# Patient Record
Sex: FEMALE | Race: WHITE | Employment: FULL TIME | URBAN - METROPOLITAN AREA
[De-identification: names, ages, dates, MRNs, and addresses within clinical notes are randomized per-mention and may not be internally consistent; named-entity substitution may affect disease eponyms.]

---

## 2019-04-25 ENCOUNTER — HOSPITAL ENCOUNTER (OUTPATIENT)
Age: 29
Setting detail: SPECIMEN
Discharge: HOME | End: 2019-04-25
Payer: COMMERCIAL

## 2019-04-25 DIAGNOSIS — R42 DIZZINESS: ICD-10-CM

## 2019-04-25 LAB
ABSOLUTE EOS #: 0.03 K/UL (ref 0–0.44)
ABSOLUTE IMMATURE GRANULOCYTE: <0.03 K/UL (ref 0–0.3)
ABSOLUTE LYMPH #: 2.39 K/UL (ref 1.1–3.7)
ABSOLUTE MONO #: 0.47 K/UL (ref 0.1–1.2)
ANION GAP SERPL CALCULATED.3IONS-SCNC: 15 MMOL/L (ref 9–17)
BASOPHILS # BLD: 1 % (ref 0–2)
BASOPHILS ABSOLUTE: 0.07 K/UL (ref 0–0.2)
BUN BLDV-MCNC: 10 MG/DL (ref 6–20)
BUN/CREAT BLD: NORMAL (ref 9–20)
CALCIUM SERPL-MCNC: 9.3 MG/DL (ref 8.6–10.4)
CHLORIDE BLD-SCNC: 103 MMOL/L (ref 98–107)
CO2: 21 MMOL/L (ref 20–31)
CREAT SERPL-MCNC: 0.58 MG/DL (ref 0.5–0.9)
DIFFERENTIAL TYPE: ABNORMAL
EOSINOPHILS RELATIVE PERCENT: 0 % (ref 1–4)
GFR AFRICAN AMERICAN: >60 ML/MIN
GFR NON-AFRICAN AMERICAN: >60 ML/MIN
GFR SERPL CREATININE-BSD FRML MDRD: NORMAL ML/MIN/{1.73_M2}
GFR SERPL CREATININE-BSD FRML MDRD: NORMAL ML/MIN/{1.73_M2}
GLUCOSE BLD-MCNC: 75 MG/DL (ref 70–99)
HCT VFR BLD CALC: 43.7 % (ref 36.3–47.1)
HEMOGLOBIN: 13.7 G/DL (ref 11.9–15.1)
IMMATURE GRANULOCYTES: 0 %
LYMPHOCYTES # BLD: 27 % (ref 24–43)
MCH RBC QN AUTO: 29 PG (ref 25.2–33.5)
MCHC RBC AUTO-ENTMCNC: 31.4 G/DL (ref 28.4–34.8)
MCV RBC AUTO: 92.4 FL (ref 82.6–102.9)
MONOCYTES # BLD: 5 % (ref 3–12)
NRBC AUTOMATED: 0 PER 100 WBC
PDW BLD-RTO: 12.6 % (ref 11.8–14.4)
PLATELET # BLD: 322 K/UL (ref 138–453)
PLATELET ESTIMATE: ABNORMAL
PMV BLD AUTO: 10.2 FL (ref 8.1–13.5)
POTASSIUM SERPL-SCNC: 4.2 MMOL/L (ref 3.7–5.3)
RBC # BLD: 4.73 M/UL (ref 3.95–5.11)
RBC # BLD: ABNORMAL 10*6/UL
SEG NEUTROPHILS: 67 % (ref 36–65)
SEGMENTED NEUTROPHILS ABSOLUTE COUNT: 6.03 K/UL (ref 1.5–8.1)
SODIUM BLD-SCNC: 139 MMOL/L (ref 135–144)
TSH SERPL DL<=0.05 MIU/L-ACNC: 1.19 MIU/L (ref 0.3–5)
WBC # BLD: 9 K/UL (ref 3.5–11.3)
WBC # BLD: ABNORMAL 10*3/UL

## 2021-03-03 ENCOUNTER — HOSPITAL ENCOUNTER (OUTPATIENT)
Age: 31
Setting detail: SPECIMEN
Discharge: HOME | End: 2021-03-03
Payer: COMMERCIAL

## 2021-03-04 ENCOUNTER — HOSPITAL ENCOUNTER (OUTPATIENT)
Age: 31
Setting detail: SPECIMEN
Discharge: HOME | End: 2021-03-04
Payer: COMMERCIAL

## 2021-03-04 LAB
ESTRADIOL LEVEL: 86 PG/ML (ref 27–314)
FOLLICLE STIMULATING HORMONE: 5.5 U/L (ref 1.7–21.5)
INSULIN COMMENT: NORMAL
INSULIN REFERENCE RANGE:: NORMAL
INSULIN: 11.9 MU/L
PROGESTERONE LEVEL: 0.06 NG/ML
PROLACTIN: 20.43 UG/L (ref 4.79–23.3)
TESTOSTERONE TOTAL: 39 NG/DL (ref 20–70)
TSH SERPL DL<=0.05 MIU/L-ACNC: 1.55 MIU/L (ref 0.3–5)

## 2021-03-09 ENCOUNTER — HOSPITAL ENCOUNTER (OUTPATIENT)
Dept: PREADMISSION TESTING | Age: 31
Discharge: HOME | End: 2021-03-13
Payer: COMMERCIAL

## 2021-03-09 VITALS
HEART RATE: 68 BPM | OXYGEN SATURATION: 97 % | WEIGHT: 216 LBS | BODY MASS INDEX: 38.27 KG/M2 | TEMPERATURE: 99.3 F | DIASTOLIC BLOOD PRESSURE: 89 MMHG | SYSTOLIC BLOOD PRESSURE: 130 MMHG | RESPIRATION RATE: 18 BRPM | HEIGHT: 63 IN

## 2021-03-09 LAB
HCT VFR BLD CALC: 43.4 % (ref 36.3–47.1)
HEMOGLOBIN: 14.4 G/DL (ref 11.9–15.1)
MCH RBC QN AUTO: 29 PG (ref 25.2–33.5)
MCHC RBC AUTO-ENTMCNC: 33.2 G/DL (ref 28.4–34.8)
MCV RBC AUTO: 87.3 FL (ref 82.6–102.9)
NRBC AUTOMATED: 0 PER 100 WBC
PDW BLD-RTO: 12.4 % (ref 11.8–14.4)
PLATELET # BLD: 309 K/UL (ref 138–453)
PMV BLD AUTO: 9.6 FL (ref 8.1–13.5)
RBC # BLD: 4.97 M/UL (ref 3.95–5.11)
WBC # BLD: 6.9 K/UL (ref 3.5–11.3)

## 2021-03-09 PROCEDURE — 85027 COMPLETE CBC AUTOMATED: CPT

## 2021-03-09 PROCEDURE — 36415 COLL VENOUS BLD VENIPUNCTURE: CPT

## 2021-03-09 PROCEDURE — 93005 ELECTROCARDIOGRAM TRACING: CPT | Performed by: ANESTHESIOLOGY

## 2021-03-09 RX ORDER — SODIUM CHLORIDE, SODIUM LACTATE, POTASSIUM CHLORIDE, CALCIUM CHLORIDE 600; 310; 30; 20 MG/100ML; MG/100ML; MG/100ML; MG/100ML
1000 INJECTION, SOLUTION INTRAVENOUS CONTINUOUS
Status: CANCELLED | OUTPATIENT
Start: 2021-03-09

## 2021-03-09 NOTE — H&P
History and Physical    Pt Name: Rosio Almeida  MRN: [id number]  YOB: 1990  Date of evaluation: 3/9/2021    SUBJECTIVE:   History of Chief Complaint:    Patient presents for PAT appointment, complains of right ankle pain. She initially had an injury while working out, was being treated for a sprain (1/2020). She continued with pain, xrays showed a fracture, was casted for a time. Patient says that she underwent injections and then MRI when her pain started to change to \"sharp pains. \"  She has been scheduled for right ankle arthroscopy, OCD repair, peroneal tendon repair. Past Medical History    has a past medical history of Back pain, Dizzy spells, Right ankle injury, and Wellness examination. Past Surgical History  none  Medications  none  Allergies  has No Known Allergies. Family History  family history includes Hypothyroidism in her maternal aunt, mother, and sister; No Known Problems in her father. Social History   reports that she has never smoked. She has never used smokeless tobacco.   reports current alcohol use. reports no history of drug use. Marital Status   Occupation works for [employer]:   VITALS:  height is 5' 3\" (1.6 m) and weight is 216 lb (98 kg). Her temporal temperature is 99.3 °F (37.4 °C). Her blood pressure is 130/89 and her pulse is 68. Her respiration is 18 and oxygen saturation is 97%. CONSTITUTIONAL:alert & oriented x 3, no acute distress. Very pleasant. SKIN:  Warm and dry, no rashes on exposed areas of skin. HEAD:  Normocephalic, atraumatic   EYES: PERRL. EOMs intact. EARS:  Hearing grossly WNL. NOSE:  Nares patent. No rhinorrhea   MOUTH/THROAT:  benign  NECK:supple, no lymphadenopathy  LUNGS: Clear to auscultation bilaterally, no wheezes. CARDIOVASCULAR: Heart sounds are normal.  Regular rate and rhythm without murmur. ABDOMEN: soft, non tender, non distended. EXTREMITIES: no edema bilateral lower extremities.     Testing: EK21  Labs pending: drawn 3/9/2021 and also in chart    IMPRESSIONS:   1. Right ankle pain, injury  2.  has a past medical history of Back pain, Dizzy spells, Right ankle injury, and Wellness examination (2021).    PLANS:   right ankle arthroscopy, OCD repair, peroneal tendon repair    WALKER Reddy PA-C  Electronically signed 3/9/2021 at 10:17 AM

## 2021-03-09 NOTE — PROGRESS NOTES
Anesthesia Focused Assessment    Has patient ever tested positive for COVID? No    STOP-BANG Sleep Apnea Questionnaire    SNORE loudly (heard through closed doors)? No  TIRED, fatigued, sleepy during daytime? No  OBSERVED stopping breathing during sleep? No  High blood PRESSURE being treated? No    BMI over 35? Yes  AGE over 48? No  NECK circumference over 16\"? No  GENDER (male)? No             Total 1  High risk 5-8  Intermediate risk 3-4  Low risk 0-2    Obstructive Sleep Apnea: denies  If YES, machine used: no     Type 1 DM:   no  T2DM:  no    Coronary Artery Disease:  no  Hypertension:  no    Active smoker:  no  Drinks Alcohol:  socially    Dentition: benign    Defib / AICD / Pacemaker: no      Renal Failure/dialysis:  no    Patient was evaluated in PAT & anesthesia guidelines were applied. NPO guidelines, medication instructions and scheduled arrival time were reviewed with patient.     Hx of anesthesia complications:  no  Family hx of anesthesia complications:  no                                                                                                                     Anesthesia contacted:   no  Medical or cardiac clearance ordered: no    WALKER KASPER PA-C  3/9/21  9:46 AM

## 2021-03-10 LAB
EKG ATRIAL RATE: 64 BPM
EKG P AXIS: 50 DEGREES
EKG P-R INTERVAL: 160 MS
EKG Q-T INTERVAL: 432 MS
EKG QRS DURATION: 86 MS
EKG QTC CALCULATION (BAZETT): 445 MS
EKG R AXIS: 78 DEGREES
EKG T AXIS: 53 DEGREES
EKG VENTRICULAR RATE: 64 BPM

## 2021-03-10 PROCEDURE — 93010 ELECTROCARDIOGRAM REPORT: CPT | Performed by: INTERNAL MEDICINE

## 2021-03-13 ENCOUNTER — HOSPITAL ENCOUNTER (OUTPATIENT)
Dept: LAB | Age: 31
Setting detail: SPECIMEN
Discharge: HOME | End: 2021-03-13
Payer: COMMERCIAL

## 2021-03-13 ENCOUNTER — HOSPITAL ENCOUNTER (OUTPATIENT)
Age: 31
Setting detail: SPECIMEN
Discharge: HOME | End: 2021-03-13
Payer: COMMERCIAL

## 2021-03-13 DIAGNOSIS — Z01.818 PRE-OP TESTING: Primary | ICD-10-CM

## 2021-03-13 DIAGNOSIS — Z01.818 PRE-OP TESTING: ICD-10-CM

## 2021-03-15 LAB
SARS-COV-2: NORMAL
SARS-COV-2: NOT DETECTED
SOURCE: NORMAL

## 2021-03-16 ENCOUNTER — ANESTHESIA EVENT (OUTPATIENT)
Dept: OPERATING ROOM | Age: 31
End: 2021-03-16
Payer: COMMERCIAL

## 2021-03-17 ENCOUNTER — ANESTHESIA (OUTPATIENT)
Dept: OPERATING ROOM | Age: 31
End: 2021-03-17
Payer: COMMERCIAL

## 2021-03-17 ENCOUNTER — HOSPITAL ENCOUNTER (OUTPATIENT)
Age: 31
Setting detail: OUTPATIENT SURGERY
Discharge: HOME | End: 2021-03-17
Attending: PODIATRIST | Admitting: PODIATRIST
Payer: COMMERCIAL

## 2021-03-17 VITALS — SYSTOLIC BLOOD PRESSURE: 143 MMHG | OXYGEN SATURATION: 90 % | DIASTOLIC BLOOD PRESSURE: 118 MMHG | TEMPERATURE: 97.5 F

## 2021-03-17 VITALS
SYSTOLIC BLOOD PRESSURE: 110 MMHG | TEMPERATURE: 97.3 F | HEART RATE: 62 BPM | OXYGEN SATURATION: 96 % | DIASTOLIC BLOOD PRESSURE: 65 MMHG | RESPIRATION RATE: 19 BRPM | BODY MASS INDEX: 38.27 KG/M2 | WEIGHT: 216 LBS | HEIGHT: 63 IN

## 2021-03-17 LAB — HCG, PREGNANCY URINE (POC): NEGATIVE

## 2021-03-17 PROCEDURE — 3600000014 HC SURGERY LEVEL 4 ADDTL 15MIN: Performed by: PODIATRIST

## 2021-03-17 PROCEDURE — 7100000010 HC PHASE II RECOVERY - FIRST 15 MIN: Performed by: PODIATRIST

## 2021-03-17 PROCEDURE — 2580000003 HC RX 258

## 2021-03-17 PROCEDURE — 2580000003 HC RX 258: Performed by: PODIATRIST

## 2021-03-17 PROCEDURE — 3700000000 HC ANESTHESIA ATTENDED CARE: Performed by: PODIATRIST

## 2021-03-17 PROCEDURE — 2720000010 HC SURG SUPPLY STERILE: Performed by: PODIATRIST

## 2021-03-17 PROCEDURE — 3600000004 HC SURGERY LEVEL 4 BASE: Performed by: PODIATRIST

## 2021-03-17 PROCEDURE — 7100000000 HC PACU RECOVERY - FIRST 15 MIN: Performed by: PODIATRIST

## 2021-03-17 PROCEDURE — 7100000001 HC PACU RECOVERY - ADDTL 15 MIN: Performed by: PODIATRIST

## 2021-03-17 PROCEDURE — 3700000001 HC ADD 15 MINUTES (ANESTHESIA): Performed by: PODIATRIST

## 2021-03-17 PROCEDURE — 6360000002 HC RX W HCPCS: Performed by: NURSE ANESTHETIST, CERTIFIED REGISTERED

## 2021-03-17 PROCEDURE — 64447 NJX AA&/STRD FEMORAL NRV IMG: CPT | Performed by: ANESTHESIOLOGY

## 2021-03-17 PROCEDURE — 6360000002 HC RX W HCPCS: Performed by: ANESTHESIOLOGY

## 2021-03-17 PROCEDURE — 2500000003 HC RX 250 WO HCPCS: Performed by: ANESTHESIOLOGY

## 2021-03-17 PROCEDURE — 2580000003 HC RX 258: Performed by: ANESTHESIOLOGY

## 2021-03-17 PROCEDURE — 6360000002 HC RX W HCPCS: Performed by: PODIATRIST

## 2021-03-17 PROCEDURE — 2500000003 HC RX 250 WO HCPCS: Performed by: PODIATRIST

## 2021-03-17 PROCEDURE — 2500000003 HC RX 250 WO HCPCS: Performed by: NURSE ANESTHETIST, CERTIFIED REGISTERED

## 2021-03-17 PROCEDURE — 64445 NJX AA&/STRD SCIATIC NRV IMG: CPT | Performed by: ANESTHESIOLOGY

## 2021-03-17 PROCEDURE — 81025 URINE PREGNANCY TEST: CPT

## 2021-03-17 PROCEDURE — 7100000011 HC PHASE II RECOVERY - ADDTL 15 MIN: Performed by: PODIATRIST

## 2021-03-17 PROCEDURE — C1713 ANCHOR/SCREW BN/BN,TIS/BN: HCPCS | Performed by: PODIATRIST

## 2021-03-17 PROCEDURE — 2709999900 HC NON-CHARGEABLE SUPPLY: Performed by: PODIATRIST

## 2021-03-17 DEVICE — SONICANCHOR KIT
Type: IMPLANTABLE DEVICE | Site: ANKLE | Status: FUNCTIONAL
Brand: SONICANCHOR

## 2021-03-17 RX ORDER — PROPOFOL 10 MG/ML
INJECTION, EMULSION INTRAVENOUS PRN
Status: DISCONTINUED | OUTPATIENT
Start: 2021-03-17 | End: 2021-03-17 | Stop reason: SDUPTHER

## 2021-03-17 RX ORDER — LIDOCAINE HYDROCHLORIDE 10 MG/ML
INJECTION, SOLUTION EPIDURAL; INFILTRATION; INTRACAUDAL; PERINEURAL PRN
Status: DISCONTINUED | OUTPATIENT
Start: 2021-03-17 | End: 2021-03-17 | Stop reason: SDUPTHER

## 2021-03-17 RX ORDER — BUPIVACAINE HYDROCHLORIDE AND EPINEPHRINE 5; 5 MG/ML; UG/ML
INJECTION, SOLUTION EPIDURAL; INTRACAUDAL; PERINEURAL PRN
Status: DISCONTINUED | OUTPATIENT
Start: 2021-03-17 | End: 2021-03-17 | Stop reason: ALTCHOICE

## 2021-03-17 RX ORDER — FENTANYL CITRATE 50 UG/ML
INJECTION, SOLUTION INTRAMUSCULAR; INTRAVENOUS PRN
Status: DISCONTINUED | OUTPATIENT
Start: 2021-03-17 | End: 2021-03-17 | Stop reason: SDUPTHER

## 2021-03-17 RX ORDER — ASPIRIN 325 MG
325 TABLET, DELAYED RELEASE (ENTERIC COATED) ORAL DAILY
Qty: 30 TABLET | Refills: 0 | Status: SHIPPED | OUTPATIENT
Start: 2021-03-17 | End: 2021-09-21

## 2021-03-17 RX ORDER — SODIUM CHLORIDE, SODIUM LACTATE, POTASSIUM CHLORIDE, CALCIUM CHLORIDE 600; 310; 30; 20 MG/100ML; MG/100ML; MG/100ML; MG/100ML
1000 INJECTION, SOLUTION INTRAVENOUS CONTINUOUS
Status: DISCONTINUED | OUTPATIENT
Start: 2021-03-17 | End: 2021-03-17 | Stop reason: HOSPADM

## 2021-03-17 RX ORDER — SODIUM CHLORIDE 9 MG/ML
INJECTION INTRAVENOUS
Status: COMPLETED
Start: 2021-03-17 | End: 2021-03-17

## 2021-03-17 RX ORDER — BUPIVACAINE HYDROCHLORIDE 5 MG/ML
INJECTION, SOLUTION EPIDURAL; INTRACAUDAL
Status: DISCONTINUED | OUTPATIENT
Start: 2021-03-17 | End: 2021-03-17 | Stop reason: SDUPTHER

## 2021-03-17 RX ORDER — MIDAZOLAM HYDROCHLORIDE 1 MG/ML
INJECTION INTRAMUSCULAR; INTRAVENOUS PRN
Status: DISCONTINUED | OUTPATIENT
Start: 2021-03-17 | End: 2021-03-17 | Stop reason: SDUPTHER

## 2021-03-17 RX ORDER — ONDANSETRON 2 MG/ML
INJECTION INTRAMUSCULAR; INTRAVENOUS PRN
Status: DISCONTINUED | OUTPATIENT
Start: 2021-03-17 | End: 2021-03-17 | Stop reason: SDUPTHER

## 2021-03-17 RX ORDER — DEXAMETHASONE SODIUM PHOSPHATE 4 MG/ML
INJECTION, SOLUTION INTRA-ARTICULAR; INTRALESIONAL; INTRAMUSCULAR; INTRAVENOUS; SOFT TISSUE PRN
Status: DISCONTINUED | OUTPATIENT
Start: 2021-03-17 | End: 2021-03-17 | Stop reason: SDUPTHER

## 2021-03-17 RX ORDER — BUPIVACAINE HYDROCHLORIDE 5 MG/ML
INJECTION, SOLUTION EPIDURAL; INTRACAUDAL PRN
Status: DISCONTINUED | OUTPATIENT
Start: 2021-03-17 | End: 2021-03-17 | Stop reason: ALTCHOICE

## 2021-03-17 RX ORDER — SODIUM CHLORIDE, SODIUM LACTATE, POTASSIUM CHLORIDE, CALCIUM CHLORIDE 600; 310; 30; 20 MG/100ML; MG/100ML; MG/100ML; MG/100ML
INJECTION, SOLUTION INTRAVENOUS CONTINUOUS
Status: DISCONTINUED | OUTPATIENT
Start: 2021-03-17 | End: 2021-03-17 | Stop reason: HOSPADM

## 2021-03-17 RX ORDER — MAGNESIUM HYDROXIDE 1200 MG/15ML
LIQUID ORAL CONTINUOUS PRN
Status: COMPLETED | OUTPATIENT
Start: 2021-03-17 | End: 2021-03-17

## 2021-03-17 RX ORDER — PROMETHAZINE HYDROCHLORIDE 25 MG/ML
6.25 INJECTION, SOLUTION INTRAMUSCULAR; INTRAVENOUS ONCE
Status: COMPLETED | OUTPATIENT
Start: 2021-03-17 | End: 2021-03-17

## 2021-03-17 RX ADMIN — ONDANSETRON 4 MG: 2 INJECTION INTRAMUSCULAR; INTRAVENOUS at 09:00

## 2021-03-17 RX ADMIN — CEFAZOLIN 2 G: 10 INJECTION, POWDER, FOR SOLUTION INTRAVENOUS at 07:46

## 2021-03-17 RX ADMIN — PROPOFOL 200 MG: 10 INJECTION, EMULSION INTRAVENOUS at 07:38

## 2021-03-17 RX ADMIN — FENTANYL CITRATE 100 MCG: 50 INJECTION, SOLUTION INTRAMUSCULAR; INTRAVENOUS at 07:41

## 2021-03-17 RX ADMIN — Medication 35 ML: at 10:23

## 2021-03-17 RX ADMIN — SODIUM CHLORIDE, POTASSIUM CHLORIDE, SODIUM LACTATE AND CALCIUM CHLORIDE: 600; 310; 30; 20 INJECTION, SOLUTION INTRAVENOUS at 06:48

## 2021-03-17 RX ADMIN — PROMETHAZINE HYDROCHLORIDE 6.25 MG: 25 INJECTION INTRAMUSCULAR; INTRAVENOUS at 10:46

## 2021-03-17 RX ADMIN — SODIUM CHLORIDE 9 ML: 9 INJECTION, SOLUTION INTRAMUSCULAR; INTRAVENOUS; SUBCUTANEOUS at 10:46

## 2021-03-17 RX ADMIN — MIDAZOLAM HYDROCHLORIDE 2 MG: 1 INJECTION, SOLUTION INTRAMUSCULAR; INTRAVENOUS at 07:34

## 2021-03-17 RX ADMIN — BUPIVACAINE HYDROCHLORIDE 20 ML: 5 INJECTION, SOLUTION EPIDURAL; INTRACAUDAL; PERINEURAL at 11:10

## 2021-03-17 RX ADMIN — FENTANYL CITRATE 25 MCG: 50 INJECTION, SOLUTION INTRAMUSCULAR; INTRAVENOUS at 09:17

## 2021-03-17 RX ADMIN — LIDOCAINE HYDROCHLORIDE 50 MG: 10 INJECTION, SOLUTION EPIDURAL; INFILTRATION; INTRACAUDAL; PERINEURAL at 07:38

## 2021-03-17 RX ADMIN — BUPIVACAINE HYDROCHLORIDE 15 ML: 5 INJECTION, SOLUTION EPIDURAL; INTRACAUDAL; PERINEURAL at 11:10

## 2021-03-17 RX ADMIN — FENTANYL CITRATE 25 MCG: 50 INJECTION, SOLUTION INTRAMUSCULAR; INTRAVENOUS at 08:44

## 2021-03-17 RX ADMIN — DEXAMETHASONE SODIUM PHOSPHATE 4 MG: 4 INJECTION, SOLUTION INTRA-ARTICULAR; INTRALESIONAL; INTRAMUSCULAR; INTRAVENOUS; SOFT TISSUE at 07:59

## 2021-03-17 ASSESSMENT — PULMONARY FUNCTION TESTS
PIF_VALUE: 18
PIF_VALUE: 3
PIF_VALUE: 3
PIF_VALUE: 14
PIF_VALUE: 14
PIF_VALUE: 0
PIF_VALUE: 16
PIF_VALUE: 15
PIF_VALUE: 3
PIF_VALUE: 1
PIF_VALUE: 1
PIF_VALUE: 4
PIF_VALUE: 10
PIF_VALUE: 4
PIF_VALUE: 19
PIF_VALUE: 14
PIF_VALUE: 4
PIF_VALUE: 3
PIF_VALUE: 2
PIF_VALUE: 17
PIF_VALUE: 11
PIF_VALUE: 14
PIF_VALUE: 15
PIF_VALUE: 15
PIF_VALUE: 14
PIF_VALUE: 3
PIF_VALUE: 3
PIF_VALUE: 4
PIF_VALUE: 2
PIF_VALUE: 3
PIF_VALUE: 3
PIF_VALUE: 0
PIF_VALUE: 14
PIF_VALUE: 3
PIF_VALUE: 11
PIF_VALUE: 0
PIF_VALUE: 14
PIF_VALUE: 1
PIF_VALUE: 14
PIF_VALUE: 14
PIF_VALUE: 17
PIF_VALUE: 3
PIF_VALUE: 10
PIF_VALUE: 2
PIF_VALUE: 13
PIF_VALUE: 6
PIF_VALUE: 3
PIF_VALUE: 3
PIF_VALUE: 19
PIF_VALUE: 0
PIF_VALUE: 0
PIF_VALUE: 3
PIF_VALUE: 2
PIF_VALUE: 14
PIF_VALUE: 0
PIF_VALUE: 16
PIF_VALUE: 3
PIF_VALUE: 14
PIF_VALUE: 2
PIF_VALUE: 10
PIF_VALUE: 3
PIF_VALUE: 2
PIF_VALUE: 17
PIF_VALUE: 14
PIF_VALUE: 3
PIF_VALUE: 3
PIF_VALUE: 8
PIF_VALUE: 22

## 2021-03-17 ASSESSMENT — PAIN SCALES - GENERAL
PAINLEVEL_OUTOF10: 0
PAINLEVEL_OUTOF10: 4
PAINLEVEL_OUTOF10: 0
PAINLEVEL_OUTOF10: 0

## 2021-03-17 ASSESSMENT — PAIN - FUNCTIONAL ASSESSMENT: PAIN_FUNCTIONAL_ASSESSMENT: 0-10

## 2021-03-17 NOTE — ANESTHESIA PRE PROCEDURE
Department of Anesthesiology  Preprocedure Note       Name:  Elma Ngo   Age:  31 y.o.  :  1990                                          MRN:  [id number]         Date:  3/17/2021      Surgeon: Makeda Goode):  Mackenzie Biggs DPM    Procedure: Procedure(s):  ANKLE ARTHROSCOPY, OCD REPAIR, MODIFIED BROSTROM PROCEDURE, PERONEAL TENDON REPAIR  (ROBYN)    Medications prior to admission:   Prior to Admission medications    Not on File       Current medications:    Current Facility-Administered Medications   Medication Dose Route Frequency Provider Last Rate Last Admin    ceFAZolin (ANCEF) 2000 mg in dextrose 5 % 50 mL IVPB  2,000 mg Intravenous Once Mackenzie Biggs DPM        lactated ringers infusion   Intravenous Continuous Nile Lucas  mL/hr at 21 0648 New Bag at 21 0648    lactated ringers infusion 1,000 mL  1,000 mL Intravenous Continuous Shawna Edmonds MD           Allergies:  No Known Allergies    Problem List:  There is no problem list on file for this patient. Past Medical History:        Diagnosis Date    Back pain     Dizzy spells     Right ankle injury     Wellness examination 2021    [facility]-[address]-last visit 2020       Past Surgical History:  History reviewed. No pertinent surgical history.     Social History:    Social History     Tobacco Use    Smoking status: Never Smoker    Smokeless tobacco: Never Used   Substance Use Topics    Alcohol use: Yes     Comment: weekly                                Counseling given: Not Answered      Vital Signs (Current):   Vitals:    21 0629   BP: (!) 144/78   Pulse: 73   Resp: 20   Temp: 97.7 °F (36.5 °C)   TempSrc: Temporal   SpO2: 100%   Weight: 216 lb (98 kg)   Height: 5' 3\" (1.6 m)                                              BP Readings from Last 3 Encounters:   21 (!) 144/78   21 130/89   20 118/60       NPO Status: Time of last liquid consumption: 2230                        Time of last solid consumption: 2230                        Date of last liquid consumption: 03/16/21                        Date of last solid food consumption: 03/16/21    BMI:   Wt Readings from Last 3 Encounters:   03/17/21 216 lb (98 kg)   03/09/21 216 lb (98 kg)   05/22/19 200 lb (90.7 kg)     Body mass index is 38.26 kg/m². CBC:   Lab Results   Component Value Date    WBC 6.9 03/09/2021    RBC 4.97 03/09/2021    HGB 14.4 03/09/2021    HCT 43.4 03/09/2021    MCV 87.3 03/09/2021    RDW 12.4 03/09/2021     03/09/2021       CMP:   Lab Results   Component Value Date     04/25/2019    K 4.2 04/25/2019     04/25/2019    CO2 21 04/25/2019    BUN 10 04/25/2019    CREATININE 0.58 04/25/2019    GFRAA >60 04/25/2019    LABGLOM >60 04/25/2019    GLUCOSE 75 04/25/2019    CALCIUM 9.3 04/25/2019       POC Tests: No results for input(s): POCGLU, POCNA, POCK, POCCL, POCBUN, POCHEMO, POCHCT in the last 72 hours.     Coags: No results found for: PROTIME, INR, APTT    HCG (If Applicable): No results found for: PREGTESTUR, PREGSERUM, HCG, HCGQUANT     ABGs: No results found for: PHART, PO2ART, NJF3MAM, EQD5LDE, BEART, Z0QBYUPI     Type & Screen (If Applicable):  No results found for: LABABO, LABRH    Drug/Infectious Status (If Applicable):  No results found for: HIV, HEPCAB    COVID-19 Screening (If Applicable):   Lab Results   Component Value Date    COVID19 Not Detected 03/13/2021           Anesthesia Evaluation  Patient summary reviewed and Nursing notes reviewed  Airway: Mallampati: II  TM distance: >3 FB   Neck ROM: full  Mouth opening: > = 3 FB Dental: normal exam         Pulmonary:Negative Pulmonary ROS and normal exam                               Cardiovascular:  Exercise tolerance: good (>4 METS),           Rhythm: regular  Rate: normal                    Neuro/Psych:   Negative Neuro/Psych ROS              GI/Hepatic/Renal: Neg GI/Hepatic/Renal ROS            Endo/Other: Negative Endo/Other ROS                    Abdominal:           Vascular: negative vascular ROS. Anesthesia Plan      general     ASA 2     (LMA)  Induction: intravenous. MIPS: Postoperative opioids intended and Prophylactic antiemetics administered. Anesthetic plan and risks discussed with patient. Plan discussed with surgical team and CRNA.                   Radha Bui MD   3/17/2021

## 2021-03-17 NOTE — ANESTHESIA PROCEDURE NOTES
Peripheral Block    Patient location during procedure: pre-op  Start time: 3/17/2021 10:30 AM  End time: 3/17/2021 10:35 AM  Staffing  Performed: anesthesiologist   Anesthesiologist: Lorna Sun MD  Preanesthetic Checklist  Completed: patient identified, IV checked, site marked, risks and benefits discussed, surgical consent, monitors and equipment checked, pre-op evaluation, timeout performed, anesthesia consent given, oxygen available and patient being monitored  Peripheral Block  Prep: ChloraPrep  Patient monitoring: cardiac monitor, continuous pulse ox, frequent blood pressure checks and IV access  Block type: Sciatic  Laterality: right  Injection technique: single-shot  Guidance: ultrasound guided  Local infiltration: lidocaine  Infiltration strength: 1 %  Dose: 3 mL  Popliteal  Provider prep: mask and sterile gloves  Local infiltration: lidocaine  Needle  Needle gauge: 21 G  Needle length: 10 cm  Needle localization: ultrasound guidance  Test dose: negative  Assessment  Injection assessment: negative aspiration for heme, no paresthesia on injection and local visualized surrounding nerve on ultrasound  Paresthesia pain: none  Slow fractionated injection: yes  Hemodynamics: stable  Additional Notes  U/S 48522.  (1) Under ultrasound guidance, a 21 gauge needle was inserted and placed in close proximity to the popliteal nerve.  (2) Ultrasound was also used to visualize the spread of the anesthetic in close proximity to the nerve being blocked. (3) The nerve appeared anatomically normal, and (4 there were no apparent abnormal pathological findings on the image that were readily visible and related to the nerve being blocked. (5) A permanent ultrasound image was saved in the patient's record.             Medications Administered  Bupivacaine (MARCAINE) PF injection 0.5%, 20 mL  Reason for block: post-op pain management and at surgeon's request

## 2021-03-17 NOTE — ANESTHESIA PROCEDURE NOTES
Peripheral Block    Patient location during procedure: pre-op  Start time: 3/17/2021 10:30 AM  End time: 3/17/2021 10:35 AM  Staffing  Performed: anesthesiologist   Anesthesiologist: Lorna Sun MD  Preanesthetic Checklist  Completed: patient identified, IV checked, site marked, risks and benefits discussed, surgical consent, monitors and equipment checked, pre-op evaluation, timeout performed, anesthesia consent given, oxygen available and patient being monitored  Peripheral Block  Patient position: supine  Prep: ChloraPrep  Patient monitoring: cardiac monitor, continuous pulse ox, frequent blood pressure checks and IV access  Block type: Saphenous  Laterality: right  Injection technique: single-shot  Guidance: ultrasound guided  Local infiltration: lidocaine  Infiltration strength: 1 %  Dose: 3 mL  Provider prep: mask and sterile gloves  Local infiltration: lidocaine  Needle  Needle gauge: 21 G  Needle length: 10 cm  Needle localization: ultrasound guidance  Test dose: negative  Assessment  Injection assessment: negative aspiration for heme, no paresthesia on injection and local visualized surrounding nerve on ultrasound  Paresthesia pain: none  Slow fractionated injection: yes  Hemodynamics: stable  Additional Notes  U/S 02009.  (1) Under ultrasound guidance, a 21 gauge needle was inserted and placed in close proximity to the saphenous  nerve.  (2) Ultrasound was also used to visualize the spread of the anesthetic in close proximity to the nerve being blocked. (3) The nerve appeared anatomically normal, and (4 there were no apparent abnormal pathological findings on the image that were readily visible and related to the nerve being blocked. (5) A permanent ultrasound image was saved in the patient's record.             Medications Administered  Bupivacaine (MARCAINE) PF injection 0.5%, 15 mL  Reason for block: post-op pain management and at surgeon's request

## 2021-03-17 NOTE — ANESTHESIA POSTPROCEDURE EVALUATION
Department of Anesthesiology  Postprocedure Note    Patient: Modesta Hutchison  MRN: [id number]  YOB: 1990  Date of evaluation: 3/17/2021  Time:  11:11 AM     Procedure Summary     Date: 03/17/21 Room / Location: [address]    Anesthesia Start: 0730 Anesthesia Stop: 2990    Procedure: ANKLE ARTHROSCOPY, , MODIFIED BROSTROM PROCEDURE, PERONEAL TENDON REPAIR  (ROBYN) (Left Ankle) Diagnosis: (OCD LEFT ANKLE, PERONEAL TENDON TEAR)    Surgeons: Yvan Reece DPM Responsible Provider: Nancy Saenz MD    Anesthesia Type: general ASA Status: 2          Anesthesia Type: general    Abraham Phase I: Abraham Score: 9    Abraham Phase II:      Last vitals: Reviewed and per EMR flowsheets.        Anesthesia Post Evaluation    Patient location during evaluation: PACU  Patient participation: complete - patient participated  Level of consciousness: awake and alert  Pain score: 0  Airway patency: patent  Nausea & Vomiting: no nausea and no vomiting  Complications: no  Cardiovascular status: hemodynamically stable  Respiratory status: room air  Hydration status: euvolemic

## 2021-03-17 NOTE — BRIEF OP NOTE
PODIATRY BRIEF OP NOTE    PATIENT NAME: Brenda Sims DATE: 1990  -  31 y.o. female  MRN: [id number]  DATE: 3/17/2021  BILLING #: [id number]    Surgeon(s):  Abram Begum DPM     ASSISTANTS: Madhu Mota DPM     PRE-OP DIAGNOSIS:   1. Right lateral ankle ligamentous laxity  2. Split thickness tear of peroneus brevis, right ankle  3. Right lateral ankle pain    POST-OP DIAGNOSIS: Same as above. PROCEDURE:   1. Brostrum, right lateral ankle  2. Repair of split thickness tear, right peroneus brevis  3. Extensive arthroscopic debridement of right ankle  4. Application of posterior splint, right ankle    ANESTHESIA: General with 20 cc of 0.5% Marcaine with epinepherine. HEMOSTASIS: Pneumatic thigh tourniquet @ 300 mmHg for 77 minutes. ESTIMATED BLOOD LOSS: Less than 5cc. MATERIALS: 2-0 monocryl, 4-0 monocryl, 3-0 nylon  Implant Name Type Inv. Item Serial No.  Lot No. LRB No. Used Action   KIT ANCHR L10MM [id number] SFT TISS PLDLLA BIORESORBABLE W  KIT ANCHR L10MM [id number] SFT TISS PLDLLA BIORESORBABLE W  ROBYN JAYESH-WD [id number] Left 1 Implanted   KIT ANCHR L10MM [id number] SFT TISS PLDLLA BIORESORBABLE W  KIT ANCHR L10MM [id number] SFT TISS PLDLLA BIORESORBABLE W  ROBYN JAYESH-WD [id number] Left 1 Implanted       INJECTABLES: 10 cc of 0.5% Marcaine plain. SPECIMEN:   * No specimens in log *    COMPLICATIONS: None    FINDINGS: Synovitis noted to the medial and lateral ankle gutters. No OCD appreciated. Ligamentous laxity of the lateral ankle complex improved after Brostrum. Split thickness tear of the peroneus brevis posterior to the fibula at the groove. The patient was counseled at length about the risks of amy Covid-19 during their perioperative period and any recovery window from their procedure.   The patient was made aware that amy Covid-19  may worsen their prognosis for recovering from their procedure  and lend to a higher morbidity and/or mortality risk.  All material risks, benefits, and reasonable alternatives including postponing the procedure were discussed. The patient does wish to proceed with the procedure at this time.     Arlin Gee DPM   Podiatric Medicine & Surgery   3/17/2021 at 9:28 AM

## 2021-03-17 NOTE — INTERVAL H&P NOTE
Pt Name: Ronal Verduzco  MRN: [id number]  YOB: 1990  Date of evaluation: 3/17/2021    I have reviewed the patient's history and physical examination completed in pre-admission testing on 3/9/21    No changes to history or on examination today, unless noted below.    Negative covid-19 screening on 3/13/21    EVELYNE VARGAS APRN-CNP  3/17/21  6:53 AM

## 2021-03-17 NOTE — OP NOTE
OP NOTE     PATIENT NAME: Lloyd Reyes  YOB: 1990  -  31 y.o. female  MRN: [id number]  DATE: 3/17/2021  BILLING #: [id number]     Surgeon(s):  Joanie Nagy DPM      ASSISTANTS: Karl Petersen DPM      PRE-OP DIAGNOSIS:   1. Right lateral ankle ligamentous laxity  2. Split thickness tear of peroneus brevis, right ankle  3. Right lateral ankle pain     POST-OP DIAGNOSIS: Same as above.     PROCEDURE:   1. Brostrum, right lateral ankle  2. Repair of split thickness tear, right peroneus brevis  3. Extensive arthroscopic debridement of right ankle  4. Application of posterior splint, right ankle     ANESTHESIA: General with 20 cc of 0.5% Marcaine with epinepherine.     HEMOSTASIS: Pneumatic thigh tourniquet @ 300 mmHg for 77 minutes.     ESTIMATED BLOOD LOSS: Less than 5cc.     MATERIALS: 2-0 monocryl, 4-0 monocryl, 3-0 nylon  Implant Name Type Inv. Item Serial No.  Lot No. LRB No. Used Action   KIT ANCHR L10MM [id number] SFT TISS PLDLLA BIORESORBABLE W   KIT ANCHR L10MM [id number] SFT TISS PLDLLA BIORESORBABLE W   ROBYN JAYESH-WD [id number] Left 1 Implanted   KIT ANCHR L10MM [id number] SFT TISS PLDLLA BIORESORBABLE W   KIT ANCHR L10MM [id number] SFT TISS PLDLLA BIORESORBABLE W   ROBYN JAYESH-WD [id number] Left 1 Implanted         INJECTABLES: 10 cc of 0.5% Marcaine plain.     SPECIMEN:   * No specimens in log *     COMPLICATIONS: None     FINDINGS: Synovitis noted to the medial and lateral ankle gutters. No OCD appreciated. Ligamentous laxity of the lateral ankle complex improved after Brostrum.  Split thickness tear of the peroneus brevis posterior to the fibula at the groove.     The patient was counseled at length about the risks of amy Covid-19 during their perioperative period and any recovery window from their procedure.  The patient was made aware that amy Covid-19  may worsen their prognosis for recovering from their procedure  and lend to a higher morbidity and/or mortality risk.  All material risks, benefits, and reasonable alternatives including postponing the procedure were discussed. The patient does wish to proceed with the procedure at this time. INDICATION FOR PROCEDURE: The patient has had chronic right lateral ankle pain after sustaining an injury to the right ankle. MRI findings revealed a possible osteochondral lesion to the lateral talar dome as well as involvement of the ATFL and flattening of the peroneus brevis. She complained of point tenderness to the posterior lateral ankle at the level of the peroneals. The patient has failed conservative therapy up to this point, and elects to undergo arthroscopic examination and debridement of the right ankle as well as primary repair of the ATFL and peroneal examination. All risks and benefits were discussed in detail with the patient. No guarantees were given or implied. Consent is signed and in the chart. PROCEDURE IN DETAIL: The patient was identified, brought to the operating room, and placed in supine position on the table. A safety belt was applied across her lap. A pneumatic thigh tourniquet was applied to the patient's right thigh. After induction of general anesthesia, the right ankle was sterilely prepped and draped. Local anesthesia was obtained with 20 cc of 0.5% Marcaine with epinephrine in an ankle block fashion. The ankle was exsanguinated with an esmark bandage and the tourniquet was inflated to 300 mmHg where it remained for 77 minutes. Following a timeout, attention was directed to the anterior medial ankle portal was established just medial to the tibialis anterior tendon, then using a spinal needle the ankle joint was insufflated with approximately 20 cc of normal saline. Next, the trocar was inserted into the established anterior medial ankle portal and passed from medial to lateral underneath the neurovascular bundle.   Then the trocar was then removed and the arthroscopic camera was then inserted into the anterior medial ankle portal and passed under the neurovascular bundle to identify the proposed anterior lateral ankle portal.  The overlying skin of the lateral ankle portal was incised with a #11 blade and then a hemostat was used to bluntly dissect down. There is noted to be synovitis throughout the medial and lateral aspect of the ankle which was sharply debrided with the shaver. Arthroscopic images were obtained before and after debridement. The entire ankle joint was then inspected with the camera and no other loose cartilage, osseous lesions, or loose bodies were identified. The camera and shaver were removed and the portals were closed using 3-0 Nylon. Attention was then directed to the right lateral ankle where it was noted that there was increased inversion of the foot and ankle with obvious laxity of the anterior talo-fibular ligament. A short curvilinear incison was made from the anterior aspect of the fibula towards the tip of the fibula. This was dissected carefully in layers. The ATFL was identified and it was obvious that it was lax and attenuated. The ATFL was than cut transversely. Using [place] from [facility], an anchor was placed in the talar neck and one in the distal fibula per manufacture guidelines. The suture from the fibular anchor was sewn into the distal ATFL section while the suture from the talar neck was sutured into the proximal ATFL in a \"pants over vest\" fashion. This shortened the ATFL and held the foot more in a rectus position. The range of motion was tested and there was less inversion noted. The incision was then extended posterior and distally using a #15 blade to the peroneal tendons where the sheath, which was sharply cut using scissors. The peroneus longus and peroneus brevis tendons were then examined, there was noted to be a longitudinal split within the peroneus brevis tendon at the level posterior to the fibula.  The split of the peroneus brevis tendon was debrided with a curette and re-approximated using 4-0 Monocryl. The tendon was then placed back into the retromalleolar groove and circumducted and placed through range of motion and noted to glide smoothly without crepitus. The site was then flushed with copious amounts of sterile saline and the peroneal retinaculum was closed using 2-0 Monocryl. Surgical site was then flushed with copious amounts of normal sterile saline and the incision was closed in layers using 2-0 Monocryl, 4-0 Monocryl, 3-0 nylon. The surgical site was then injected with 10 cc of 0.5% Marcaine plain. A short leg posterior splint was then applied. The pneumatic thigh tourniquet was then released with immediate hyperemia to the distal digits on the right foot. Anesthesia was then reversed. The patient tolerated the procedure and anesthesia well and without complication. Patient was transported to the PACU with neurovascular status intact to the right foot. The patient will remain nonweightbearing to the right lower extremity. The patient will follow-up with Dr. Preet Alan as scheduled.       Electronically signed by Mandi Perez DPM on 3/17/2021 at 2:47 PM

## 2021-04-15 ENCOUNTER — HOSPITAL ENCOUNTER (OUTPATIENT)
Dept: PHYSICAL THERAPY | Age: 31
Setting detail: THERAPIES SERIES
Discharge: HOME | End: 2021-04-15
Payer: COMMERCIAL

## 2021-04-15 PROCEDURE — 97116 GAIT TRAINING THERAPY: CPT

## 2021-04-15 PROCEDURE — 97161 PT EVAL LOW COMPLEX 20 MIN: CPT

## 2021-04-15 NOTE — PROGRESS NOTES
[address]  [address] [facility]         Phone: [phone]       Fax: [phone]    Physical Therapy Lower Extremity Evaluation    Date:  4/15/2021  Patient: Marina Peralta  : 1990  MRN: [id number]  Physician: Darrion Decker DPM  Medical Diagnosis: (R) ankle/peroneal tendon repair/lateral ankle stabilization    Rehab Codes: (R) ankle pain with mobility deficits and weakness. Onset date: 2021   Next Dr's appt.: 2021  PT Visit Information  PT Insurance Information: [address]  Total # of Visits Approved: 18  Total # of Visits to Date: 1  No Show: 0  Canceled Appointment: 0       Subjective  CC: (R) ankle pain   HPI: (2020) History of a fall involving the (R) ankle/surgery performed on 2021. Currently using a CAM boot with axillary crutches PWB to FWB as therapy progresses. Scheduled to RTW on 2021.      PMHx: [] Unremarkable [] Diabetes [] HTN  [] Pacemaker   [] MI/Heart Problems [] Cancer [] Arthritis   [] Other:              [x] Refer to full medical chart  In EPIC     Comorbidities  [] Obesity [] Dialysis  [] Other:   [] Asthma/COPD [] Dementia [] Other:   [] Stroke [] Sleep apnea [] Other:   [] Vascular disease [] Rheumatic disease [] Other:     Tests: [] X-Ray: [] MRI:  [] Other:     Medications: [x] Refer to full medical record [] None [] Other:  Allergies:      [x] Refer to full medical record [] None [] Other:     Normal Deficit Comments   Follows commands [x] []    Vision [x] []    Hearing [x] []    Orientation [x] []      Pain  Pain:  [x] Yes   [] No      Location: (R) ankle lateral malleolus   Pain Rating: (0-10 scale) 2/10  Worst: 2/10  Best: 1/10  Symptoms:  [x] Improving [] Worsening       [] Same  Descriptors: constant, sharp, shooting, sharp, aching  Aggravating factors: WB ADLs   Relieving factors: NWB/rest and repositioning  Sleep: Disturbed   Other:     Function  Hand Dominance  [x] Right  [] Left  Working:  [] Normal Duty  [] Light Duty  [x] Off D/T Condition  [] Retired    [] Not Employed    []  Disability  [] Other:           Return to work:   Job/ADL Description:  Ophthalmology Tech     ADL/IADL Previous level of function Current level of function Who currently assists the patient with task/Comments   Bathing  [x] Independent  [] Assist [x] Independent  [] Assist    Dress/grooming [x] Independent  [] Assist [x] Independent  [] Assist    Transfer/mobility [x] Independent  [] Assist [x] Independent  [] Assist    Feeding [x] Independent  [] Assist [x] Independent  [] Assist    Toileting [x] Independent  [] Assist [x] Independent  [] Assist    Driving [x] Independent  [] Assist [] Independent  [x] Assist  has to drive per physician's orders   Housekeeping [x] Independent  [] Assist [] Independent  [x] Assist    Grocery shop/meal prep [] Independent  [x] Assist [] Independent  [x] Assist      Gait Prior level of function Current level of function    [x] Independent  [] Assist [x] Independent  [] Assist   Device: [x] Independent [x] Independent    [] Straight Cane [] Quad cane [] Straight Cane [] Quad cane    [] Standard walker [] Rolling walker   [] 4 wheeled walker [] Standard walker [] Rolling walker   [] 4 wheeled walker [x] axillary crutches with CAM Boot     [] Wheelchair [] Wheelchair     Objective  Observation/Palpation   Normal Deficit Comments   Observation [] [x] (B) genu recurvatum    Posture  [] []    Palpation [] [x] Tender to the touch within the lateral malleolus    Edema [] [x] . 5 cm @ the ankle joint line compared to the (L)/mid foot - (R) = (L)   Scar [x] []    Other [] []      Range of Motion    Right Lower Extremity - Active ROM    Knee flexion  WNL  Knee extension  WNL  Dorsiflexion  WNL   Plantarflexion  WNL  Inversion  WNL with pain @ end range  Eversion  WNL     Left Lower Extremity - Active ROM  WNL in all planes of the knee and ankle    Strength  Right Lower Extremity - Strength    Dorsiflexion  3-/5  Plantarflexion  4/5  Inversion  Pain @ end range (WNL gravity neutral)  Eversion  2+/5 - 3-/5     Left Lower Extremity - Strength  5/5 MMT within all planes of the hip, knee and ankle     Additional Measures    Normal Deficit Comments   Sensation   [x] [] L2-S1 (R) = (L) with light touch    Reflexes   [] []    Gross motor   [] []    Flexibility    [] []    Special Tests   [] []    Other   [] []      Gait Assessment  Assistive device: axillary crutches    Comments: PWB on the (R) with a decreased step on the (L) LE     Balance  Standing balance/SLS was not assessed /currently PWB to FWB (R) LE with CAM boot and axillary crutches     Assessment  Patient presented with severe irritability within the (R) ankle/lateral malleolus. Tenderness to the touch. Ankle ROM was intact. Weakness was apparent within the transverse plane      Patient would continue to benefit from skilled physical therapy services in order to assist with pain control and strength for her (R) ankle to allow her to perform her ADLs without pain/limitation. Problems:    [x] ? Pain: 1-2/10 (R) ankle       [x] ? Strength: Inversion/Eversion - 2+/5 - 3-/5   [x] ? Function: FAAM = 29 points    [x] Edema: .5 cm at the (R) ankle joint line compared to the (L)   [x] Postural Deviations (B) genu recurvatum   [x] Gait Deviations Decreased step length on the (L) LE  [] Other:      STG: (to be met in 9 treatments)  1. No complaints of pain will be reported within the (R) ankle at rest/ADLs. LTG: (to be met in 18 treatments)  1. Pt will demonstrate 5/5 MMT within all involved planes of the (R) ankle to assist with (I) function. 2. Establish a normal gait pattern without a device. 3. FAAM improved by 8-9 points (Caryn Serrano [employer] 112)  4. Pt will demonstrate independence with her home exercise program.             Patient goals:   To be able to walk comfortably and start exercising    Functional Assessment Used: FAAM  Current Status: Score 29 points   Goal Status: Score 38 points     Evaluation Complexity: Low     History:  No personal factors were apparent that may have an effect on this patient's plan of care. Exam:  (R) ankle 1-2/10, weakness in the transverse plane, antalgic gait   Clinical Presentation: Pt's symptoms are evolving  Barriers to Learning:  None    Rehab Potential:  [x] Good  [] Fair  [] Poor   Suggested Professional Referral:  [x] No  [] Yes:  Barriers to Goal Achievement[de-identified]  [x] No  [] Yes:  Domestic Concerns:  [x] No  [] Yes:    Pt. Education:  [] Plans/Goals, Risks/Benefits discussed  [] Home exercise program    Method of Education: [] Verbal  [] Demo  [] Written  Comprehension of Education:  [] Verbalizes understanding. [] Demonstrates understanding. [] Needs Review. [] Demonstrates/verbalizes understanding of HEP/Ed previously given. Treatment Plan:  [x] Therapeutic Exercise   54521  [] Iontophoresis: 4 mg/mL Dexamethasone Sodium Phosphate  mAmin  40432   [] Therapeutic Activity  54586 [x] Vasopneumatic cold with compression  33230    [] Gait Training   78511 [] Ultrasound   28668   [] Neuromuscular Re-education  97906 [] Electrical Stimulation Unattended  34182   [] Manual Therapy  79671 [] Electrical Stimulation Attended  47584   [x] Instruction in HEP  [] Lumbar/Cervical Traction  91905   [] Aquatic Therapy   79299 [] Cold/hotpack    [] Massage   12005      [] Dry Needling, 1 or 2 muscles  61152   [] Biofeedback, first 15 minutes   63051  [] Biofeedback, additional 15 minutes   34133 [] Dry Needling, 3 or more muscles  94830     []  Medication allergies reviewed for use of    Dexamethasone Sodium Phosphate 4mg/ml     with iontophoresis treatments. Pt is not allergic.     Frequency:  2-3 x/week for 18 visits    Todays Treatment:  Modalities:   Precautions: (R) ankle PWB to FWB with CAM Boot  Exercises:  Exercise Reps/ Time Weight/ Level Comments                                 Other: Gait training

## 2021-04-21 ENCOUNTER — HOSPITAL ENCOUNTER (OUTPATIENT)
Dept: PHYSICAL THERAPY | Age: 31
Setting detail: THERAPIES SERIES
Discharge: HOME | End: 2021-04-21
Payer: COMMERCIAL

## 2021-04-21 PROCEDURE — 97110 THERAPEUTIC EXERCISES: CPT

## 2021-04-21 PROCEDURE — 97016 VASOPNEUMATIC DEVICE THERAPY: CPT

## 2021-04-21 NOTE — FLOWSHEET NOTE
[facility]   [address] [place] [address] [address]   Phone: [phone]   Fax: [phone]    Physical Therapy Daily Treatment Note      Date:  2021  Patient Name:  Ever Braun    :  1990  MRN: [id number]  Physician: Chuck Lomeli DPM  PT Insurance Information: [address]  Medical Diagnosis: (R) ankle/peroneal tendon repair/lateral ankle stabilization                 Rehab Codes: (R) ankle pain with mobility deficits and weakness. Onset date: 2021       Next 's appt.: 2021  Visit# / total visits:   Cancels/No Shows: 0/0    Subjective:  Pt states she is compliant with WB status. Pain:  [x] Yes  [] No Location: R lateral aspect of ankle Pain Rating: (0-10 scale) 4/10  Pain altered Tx:  [x] No  [] Yes  Action:  Comments:    Objective:  Modalities: VASO 15' 34* low pressure 2021; pt long seated on mat table this session. Precautions:  Exercises:  Exercise Reps/ Time Weight/ Level   Towel stretch Light 10x 3\"    Ankle pumps, IV/EV 20x    Ankle circles/alphabet 20x    Supine SLR 10x2    SL hip ABD 10x2    Clam shells 10x2    PROM of ankle          Gait training 5'    Other:      Specific Instructions for next treatment: Standing on LLE performing AROM on RLE, gait training. Assessment: [x] Progressing toward goals. [] No change. [] Other:    [x] Patient would continue to benefit from skilled physical therapy services in order to: assist with pain control and strength for her (R) ankle to allow her to perform her ADLs without pain/limitation. Pt utilizing B crutches this session demos step to/step through gait pattern with upright posture. Discomfort noted with AROM Inversion at lateral malleolus and discomfort felt with DF in PROM. VC needed to correct pt's posture and technique throughout session. Pt demos good carry over of instruction of exercises.   Pt ed on proper gait with B crutches with good jong over of instruction. Vaso to R ankle to dec edema and discomfort. No c/o pain post session. Goals  STG/LTG  STG: (to be met in 9 treatments)  1. No complaints of pain will be reported within the (R) ankle at rest/ADLs.     LTG: (to be met in 18 treatments)  1. Pt will demonstrate 5/5 MMT within all involved planes of the (R) ankle to assist with (I) function. 2. Establish a normal gait pattern without a device. 3. FAAM improved by 8-9 points (Caryn Serrano [employer] 112)  4. Pt will demonstrate independence with her home exercise program.             Patient goals: To be able to walk comfortably and start exercising    Pt. Education:  [x] Yes  [] No  [x] Reviewed Prior HEP/Ed  Method of Education: [x] Verbal  [] Demo  [x] Written  Comprehension of Education:  [x] Verbalizes understanding. [] Demonstrates understanding. [] Needs review. [] Demonstrates/verbalizes HEP/Ed previously given. Plan: [x] Continue per plan of care.    [] Other:      Treatment Charges: Mins Units   []  Modalities     [x]  Ther Exercise 25 2   []  Manual Therapy     []  Ther Activities     []  Aquatics     []  Neuromuscular     [x] Vasocompression 15 1   [x] Gait Training 5 -   [] Dry needling        [] 1 or 2 muscles        [] 3 or more muscles     []  Other     Total Treatment time 45 3     Time In: 4980            Time Out: 1900    Electronically signed by:  Randi Clarke PTA

## 2021-04-26 ENCOUNTER — HOSPITAL ENCOUNTER (OUTPATIENT)
Dept: PHYSICAL THERAPY | Age: 31
Setting detail: THERAPIES SERIES
Discharge: HOME | End: 2021-04-26
Payer: COMMERCIAL

## 2021-04-26 PROCEDURE — 97110 THERAPEUTIC EXERCISES: CPT

## 2021-04-26 PROCEDURE — 97016 VASOPNEUMATIC DEVICE THERAPY: CPT

## 2021-04-26 NOTE — PROGRESS NOTES
[facility]   [address]   Phone: [phone]   Fax: [phone]    Physical Therapy Daily Treatment Note    Date:  2021  Patient: Samia Guadalupe  : 1990  MRN: [id number]  Physician: Isai Curiel DPM  Medical Diagnosis: (R) ankle/peroneal tendon repair/lateral ankle stabilization                 Rehab Codes: (R) ankle pain with mobility deficits and weakness. Onset date: 2021       Next Dr's appt.: 2021  PT Visit Information  PT Insurance Information: [address]  Total # of Visits Approved: 18  Total # of Visits to Date: 3  No Show: 0  Canceled Appointment: 0    Subjective  Patient stated that her symptom are relatively unchanged from her initial evaluation. She has been ambulating around her house with the CAM boot/no crutches with slight discomfort in the foot. Currently using both axillary crutches within the community. Pain:  [x] Yes   [] No      Location: (R) ankle lateral malleolus   Pain Rating: (0-10 scale) 2/10   Worst: 2/10  Best: 1/10  Descriptors:  constant, sharp, shooting, sharp, aching  Other:     Objective  Modalities:   Precautions:   Exercises:  Exercise Reps/ Time Weight/ Level Comments   Standing Weight Shifts  20 reps   Forward/Bend and Side To Side    Standing Heel Raise with UE support  5 reps      Standing Toes Raise with UE support  5 reps      Seated (R) ankle dorsiflexion 10 reps      Prone (R) ankle plantarflexion with the knee @ 90  10 reps      Side lying (R) ankle inversion  10 reps      Side lying (R) ankle eversion  10 reps                   Gait  Slight decreased step length was noted on the (L) LE with a single axillary crutch/discomfort reported with the (R) ankle. Specific Instructions for next treatment: Continue to progress with open/closed chain strengthening for the (R) ankle.     Pt. Education:  [] Yes  [] No  [] Reviewed Prior HEP/Ed  Method of Education: [] Verbal  [] Demo  [] Written  HEP:   Comprehension of Education:  [] Verbalizes understanding. [] Demonstrates understanding. [] Needs review. [] Demonstrates/verbalizes HEP/Ed previously given. Activity Tolerance  Patient tolerated treatment well     Assessment  Added closed chain component to the exercise program. Pt appeared slightly apprehensive with weight shifting and heel raises. Good sequencing with a single axillary crutch was noted. Recommended that she use the single axillary crutch in the community and no crutches within her home. She agreed     Goals  STG: (to be met in 9 treatments)  1. No complaints of pain will be reported within the (R) ankle at rest/ADLs.     LTG: (to be met in 18 treatments)  1. Pt will demonstrate 5/5 MMT within all involved planes of the (R) ankle to assist with (I) function. 2. Establish a normal gait pattern without a device. 3. FAAM improved by 8-9 points (Caryn Serrano [address] 112)  4. Pt will demonstrate independence with her home exercise program.     Plan: [x] Continue per plan of care.    [] Other:      Treatment Charges: Mins Units   []  Modalities     [x]  Ther Exercise 30 2   []  Manual Therapy     []  Ther Activities     []  Aquatics     []  Neuromuscular     [x] Vasocompression 15 1   [] Gait Training     [] Dry needling        [] 1 or 2 muscles        [] 3 or more muscles     []  Other     Total Treatment time 45 3     Time In: 4019          Time Out: 1910    Electronically signed by:  Bismark Jalloh, PT DPT

## 2021-04-28 ENCOUNTER — HOSPITAL ENCOUNTER (OUTPATIENT)
Dept: PHYSICAL THERAPY | Age: 31
Setting detail: THERAPIES SERIES
Discharge: HOME | End: 2021-04-28
Payer: COMMERCIAL

## 2021-04-28 PROCEDURE — 97110 THERAPEUTIC EXERCISES: CPT

## 2021-04-28 NOTE — FLOWSHEET NOTE
understanding. [] Needs review. [] Demonstrates/verbalizes HEP/Ed previously given. Activity Tolerance  Patient tolerated treatment well     Assessment  4/28/2021: Gait: utilized one crutch with step through gait with proper heel strike/toe off with CAM boot. Added resisted 3 way ankle this session. Ankle inversion was difficult for pt with resistance. Pt tolerated standing exercises well this session with no rest breaks needed. Pt declined vaso this session d/t having plans after tx. Goals  STG: (to be met in 9 treatments)  1. No complaints of pain will be reported within the (R) ankle at rest/ADLs.     LTG: (to be met in 18 treatments)  1. Pt will demonstrate 5/5 MMT within all involved planes of the (R) ankle to assist with (I) function. 2. Establish a normal gait pattern without a device. 3. FAAM improved by 8-9 points (Caryn Serrano [address] 112)  4. Pt will demonstrate independence with her home exercise program.     Plan: [x] Continue per plan of care.    [] Other:      Treatment Charges: Mins Units   []  Modalities     [x]  Ther Exercise 45 3   []  Manual Therapy     []  Ther Activities     []  Aquatics     []  Neuromuscular     [] Vasocompression     [] Gait Training     [] Dry needling        [] 1 or 2 muscles        [] 3 or more muscles     []  Other     Total Treatment time 45 3     Time In: 9872         Time Out:1600    Electronically signed by:  Johnny Dietrich PTA

## 2021-05-03 ENCOUNTER — HOSPITAL ENCOUNTER (OUTPATIENT)
Dept: PHYSICAL THERAPY | Age: 31
Setting detail: THERAPIES SERIES
Discharge: HOME | End: 2021-05-03
Payer: COMMERCIAL

## 2021-05-03 PROCEDURE — 97110 THERAPEUTIC EXERCISES: CPT

## 2021-05-03 NOTE — FLOWSHEET NOTE
[address] [facility]   [address] [facility] [address]   Phone: [phone]   Fax: [phone]    Physical Therapy Daily Treatment Note    Date:  5/3/2021  Patient: Livia Denton  : 1990  MRN: [id number]  Physician: Adrienne Doe DPM  Medical Diagnosis: (R) ankle/peroneal tendon repair/lateral ankle stabilization                 Rehab Codes: (R) ankle pain with mobility deficits and weakness. Onset date: 2021 surgery date   Next Dr's appt.: 2021  PT Visit Information  PT Insurance Information: [address]  Total # of Visits Approved: 18  Total # of Visits to Date: 5  No Show: 0  Canceled Appointment: 0    Subjective  Pt saw the doctor today and he wants pt to walk in the boot, full WB, comfortably, and with no pain. Pt was directed to stay in the boot for another 2 weeks. After 2 weeks she will use an external ankle support. Pt reports she continues to feel pain in full weight bearing while wearing the boot. Pt was cleared to return to work in 2 weeks. Pain:  [x] Yes   [] No      Location: (R) ankle lateral malleolus   Pain Rating: (0-10 scale) 2-3/10   Worst: 4/10  Best: 2/10  Descriptors:  constant, sharp, shooting, sharp, aching  Other:     Objective  Modalities:   Precautions:   Exercises:  Exercise Reps/ Time Weight/ Level Comments   Long seated gastroc stretch with belt 30s x 3 sets  Follow with active DF (x10)   Side-lying (R) inversion 10 x 2  Against gravity   Side-lying (R) eversion 10  Against gravity   Supine (R) eversion 10  Gravity neutral   Seated rocker board - PF/DF; IV/EV   Within current range   (R) SLS w/ UE support 15 sec x 5             Manual Therapy  Seated medial gastroc-soleus soft tissue mobilization x 5 min    Specific Instructions for next treatment: Continue to progress with open/closed chain strengthening for the (R) ankle.     Pt. Education:  [x] Yes  [] No  [x] Reviewed Prior HEP/Ed  Method of Education: [x]

## 2021-05-05 ENCOUNTER — HOSPITAL ENCOUNTER (OUTPATIENT)
Dept: PHYSICAL THERAPY | Age: 31
Setting detail: THERAPIES SERIES
Discharge: HOME | End: 2021-05-05
Payer: COMMERCIAL

## 2021-05-05 PROCEDURE — 97140 MANUAL THERAPY 1/> REGIONS: CPT

## 2021-05-05 PROCEDURE — 97110 THERAPEUTIC EXERCISES: CPT

## 2021-05-05 NOTE — FLOWSHEET NOTE
[facility]   [address]   Phone: [phone]   Fax: [phone]    Physical Therapy Daily Treatment Note    Date:  2021  Patient: Cameron Tan  : 1990  MRN: [id number]  Physician: Sury Vanegas DPM  Medical Diagnosis: (R) ankle/peroneal tendon repair/lateral ankle stabilization                 Rehab Codes: (R) ankle pain with mobility deficits and weakness. Onset date: 2021 surgery date   Next 's appt.: 2021  PT Visit Information  PT Insurance Information: [address]  Total # of Visits Approved: 18  Total # of Visits to Date: 6  No Show: 0  Canceled Appointment: 0    Subjective  Pt states she has been walking around home without use of the crutch without difficulty. Pain:  [x] Yes   [] No      Location: (R) ankle lateral malleolus   Pain Rating: (0-10 scale) 2-3/10   Worst: 4/10  Best: 2/10  Descriptors:  constant, sharp, shooting, sharp, aching  Other:     Objective  Modalities:   Precautions:   Exercises:  Exercise Reps/ Time Weight/ Level Comments   Long seated gastroc stretch with belt 30s x 3 sets  Follow with active DF (x10)   Side-lying (R) inversion 10 x 2  Against gravity   Side-lying (R) eversion 10x  Against gravity   Supine (R) eversion 10x  Gravity neutral   Seated rocker board - PF/DF; IV/EV; CW/CCW 10x  Within current range; 5/5 used BAPS board   (R) SLS w/ UE support 15 sec x 5     Foot Doming 10x with 3 sec holds       Manual Therapy  Seated medial gastroc-soleus soft tissue mobilization with rock blade x 10 min    Specific Instructions for next treatment: Continue to progress with open/closed chain strengthening for the (R) ankle. Pt. Education:  [x] Yes  [] No  [x] Reviewed Prior HEP/Ed  Method of Education: [x] Verbal  [] Demo  [x] Written  HEP:   Comprehension of Education:  [x] Verbalizes understanding. [] Demonstrates understanding. [] Needs review.   [] Demonstrates/verbalizes HEP/Ed previously given. Activity Tolerance  Patient tolerated treatment well     Assessment  Pain with Medial movement on BAPS board noted. Added BAPs board, foot doming and toe yoga (great toe/little toe extension) this session without difficulty. Use of Rock Blade for [facility] added this session with tenderness felt at soleus near medial malleolus. Goals  STG: (to be met in 9 treatments)  1. No complaints of pain will be reported within the (R) ankle at rest/ADLs.     LTG: (to be met in 18 treatments)  1. Pt will demonstrate 5/5 MMT within all involved planes of the (R) ankle to assist with (I) function. 2. Establish a normal gait pattern without a device. 3. FAAM improved by 8-9 points (Caryn Herfracisco [address] 112)  4. Pt will demonstrate independence with her home exercise program.     Plan: [x] Continue per plan of care.    [] Other:      Treatment Charges: Mins Units   []  Modalities     [x]  Ther Exercise 35 2   [x]  Manual Therapy 10 1   []  Ther Activities     []  Aquatics     []  Neuromuscular     [] Vasocompression     [] Gait Training     [] Dry needling        [] 1 or 2 muscles        [] 3 or more muscles     []  Other     Total Treatment time 45 3     Time In: 4642        Time Out: 8478    Electronically signed by:  Chela Handy PTA

## 2021-05-13 ENCOUNTER — HOSPITAL ENCOUNTER (OUTPATIENT)
Dept: PHYSICAL THERAPY | Age: 31
Setting detail: THERAPIES SERIES
Discharge: HOME | End: 2021-05-13
Payer: COMMERCIAL

## 2021-05-13 ENCOUNTER — APPOINTMENT (OUTPATIENT)
Dept: PHYSICAL THERAPY | Age: 31
End: 2021-05-13
Payer: COMMERCIAL

## 2021-05-13 PROCEDURE — 97110 THERAPEUTIC EXERCISES: CPT

## 2021-05-13 NOTE — FLOWSHEET NOTE
[address]   [address]   Phone: [phone]   Fax: [phone]    Physical Therapy Daily Treatment Note    Date:  2021  Patient: Kimi Birmingham  : 1990  MRN: [id number]  Physician: Demi Blevins DPM  Medical Diagnosis: (R) ankle/peroneal tendon repair/lateral ankle stabilization                 Rehab Codes: (R) ankle pain with mobility deficits and weakness. Onset date: 2021 surgery date   Next Dr's appt.: 2021  PT Visit Information  PT Insurance Information: [address]  Total # of Visits Approved: 18  Total # of Visits to Date: 7  No Show: 0  Canceled Appointment: 0    Subjective  Patient stated that her (R) ankle is healing well. Still using CAM boot per physician's order. However, no longer using an assistive device for ambulation except when she is in shopping mall or grocery store for prolonged periods of time. She now able to drive. No difficulty reported. Pain:  [] Yes   [] No      Location: (R) ankle lateral malleolus   Pain Rating: (0-10 scale) 0-1/10   Worst: 1/10  Best: 0/10  Descriptors: \"intermittent\" sharp, achy   Other:     Objective  Modalities:   Precautions:   Exercises:  Exercise Reps/ Time Weight/ Level Comments   Long seated gastroc stretch with belt 30s x 3 sets  Follow with active DF (x10)   Standing (R) ankle dorsiflexion  10 reps      Standing Toe Raises  10 reps      Standing Heel Raises 10 reps      Seated BAPS 30 reps   Forward/Backward, Side to Side, CW and CCW with each one   Foot Doming 10x with 3 sec holds       Specific Instructions for next treatment: Continue to progress with open/closed chain strengthening for the (R) ankle. Pt. Education:  [x] Yes  [] No  [x] Reviewed Prior HEP/Ed  Method of Education: [x] Verbal  [] Demo  [x] Written  HEP:   Comprehension of Education:  [x] Verbalizes understanding. [] Demonstrates understanding. [] Needs review.   []

## 2021-05-17 ENCOUNTER — APPOINTMENT (OUTPATIENT)
Dept: PHYSICAL THERAPY | Age: 31
End: 2021-05-17
Payer: COMMERCIAL

## 2021-05-17 ENCOUNTER — HOSPITAL ENCOUNTER (OUTPATIENT)
Dept: PHYSICAL THERAPY | Age: 31
Setting detail: THERAPIES SERIES
Discharge: HOME | End: 2021-05-17
Payer: COMMERCIAL

## 2021-05-17 PROCEDURE — 97110 THERAPEUTIC EXERCISES: CPT

## 2021-05-17 NOTE — FLOWSHEET NOTE
509 [facility]   [address]   Phone: [phone]   Fax: [phone]    Physical Therapy Daily Treatment Note    Date:  2021  Patient: Cameron Tan  : 1990  MRN: [id number]  Physician: Sury Vanegas DPM  Medical Diagnosis: (R) ankle/peroneal tendon repair/lateral ankle stabilization                 Rehab Codes: (R) ankle pain with mobility deficits and weakness. Onset date: 2021 surgery date   Next Dr's appt.: 2021  PT Visit Information  PT Insurance Information: [address]  Total # of Visits Approved: 18  Total # of Visits to Date: 8  No Show: 0  Canceled Appointment: 0    Subjective  Patient reported minimal pain in the (R) ankle. Over the weekend, she stated that it was [place] and red\" after standing/walking for too long in the CAM boot. Patient reported that the pain subsided after rest. No bruising was noted. Patient still experiences some intermittent pain moving in inversion (\"pulling\" sensation). Pain:  [x] Yes   [] No      Location: (R) ankle lateral malleolus   Pain Rating: (0-10 scale) 1/10   Worst: 2/10  Best: 0/10  Descriptors: \"intermittent\" sharp, achy   Other:     Objective  Modalities: Hot pack to calf with passive stretching  Precautions:   Exercises:  Exercise Reps/ Time Weight/ Level Comments   Long seated gastroc stretch with belt 30s x 3 sets  Follow with active DF (x10)   Standing (R) ankle dorsiflexion  10 reps hold in DF for 5 seconds     Standing Toe Raises  10 reps x2      Standing Heel Raises 10 reps x2      Seated BAPS 30 reps   Forward/Backward, Side to Side, CW and CCW with each one   Foot Doming 10x with 3 sec holds     Towel pulls IV/EV 3x each direction  DF to reposition foot back to starting position to continue to pull the towel in the same direction.      Specific Instructions for next treatment: Continue to progress with open/closed chain strengthening for the (R) ankle.    Pt. Education:  [] Yes  [] No  [x] Reviewed Prior HEP/Ed  Method of Education: [x] Verbal  [] Demo  [] Written  HEP:   Comprehension of Education:  [x] Verbalizes understanding. [] Demonstrates understanding. [] Needs review. [] Demonstrates/verbalizes HEP/Ed previously given. Activity Tolerance  Patient tolerated treatment well     Assessment  Patient demonstrated an increased pace while walking in the CAM boot. Patient continued to perform current exercise program with minimal irritability. Patient demonstrated improved motor control with the BAPs board in all directions of motion. Added IV/EV exercise to work in improving strength on the sides of the ankle. Patient tolerated IV/EV well with minimal discomfort (patient described feeling her muscles working). Goals  STG: (to be met in 9 treatments)  1. No complaints of pain will be reported within the (R) ankle at rest/ADLs.     LTG: (to be met in 18 treatments)  1. Pt will demonstrate 5/5 MMT within all involved planes of the (R) ankle to assist with (I) function. 2. Establish a normal gait pattern without a device. 3. FAAM improved by 8-9 points (Caryn Serrano Ultr[employer] 112)  4. Pt will demonstrate independence with her home exercise program.     Plan: [x] Continue per plan of care.    [] Other:      Treatment Charges: Mins Units   []  Modalities     [x]  Ther Exercise 40 3   []  Manual Therapy     []  Ther Activities     []  Aquatics     []  Neuromuscular     [] Vasocompression     [] Gait Training     [] Dry needling        [] 1 or 2 muscles        [] 3 or more muscles     []  Other     Total Treatment time 40 3     Time In: 5075       Time Out: 2530    Electronically signed by:  Rosio Norwood, PT  DPT

## 2021-05-18 ENCOUNTER — HOSPITAL ENCOUNTER (OUTPATIENT)
Age: 31
Setting detail: SPECIMEN
Discharge: HOME | End: 2021-05-18
Payer: COMMERCIAL

## 2021-05-18 LAB
ABO/RH: NORMAL
ESTIMATED AVERAGE GLUCOSE: 91 MG/DL
ESTRADIOL LEVEL: 62 PG/ML (ref 27–314)
HBA1C MFR BLD: 4.8 % (ref 4–6)
HCG QUANTITATIVE: <1 IU/L
HEPATITIS B CORE TOTAL ANTIBODY: NONREACTIVE
HEPATITIS B SURFACE ANTIGEN: NONREACTIVE
HEPATITIS C ANTIBODY: NONREACTIVE
HIV AG/AB: NONREACTIVE
LH: 15.1 U/L (ref 1–95.6)
PROGESTERONE LEVEL: 0.09 NG/ML
PROLACTIN: 13.31 UG/L (ref 4.79–23.3)
RUBV IGG SER QL: >500 IU/ML
T. PALLIDUM, IGG: NONREACTIVE
VITAMIN D 25-HYDROXY: 14.8 NG/ML (ref 30–100)

## 2021-05-19 LAB
C. TRACHOMATIS DNA ,URINE: NEGATIVE
CYTOMEGALOVIRUS IGG ANTIBODY: 0.1
N. GONORRHOEAE DNA, URINE: NEGATIVE
SPECIMEN DESCRIPTION: NORMAL
VZV IGG SER QL IA: 2.35

## 2021-05-21 LAB — ANTI-MULLERIAN HORMONE: 13.4 NG/ML (ref 0.18–11.71)

## 2021-05-23 LAB
FACTOR V MUTATION: NEGATIVE
MTHFR INTERPRETATION: NORMAL
MTHFR MUTATION A1286C: NORMAL
MTHFR MUTATION C665T: NORMAL
SPECIMEN: NORMAL
SPECIMEN: NORMAL

## 2021-05-26 ENCOUNTER — HOSPITAL ENCOUNTER (OUTPATIENT)
Dept: PHYSICAL THERAPY | Age: 31
Setting detail: THERAPIES SERIES
Discharge: HOME | End: 2021-05-26
Payer: COMMERCIAL

## 2021-05-26 PROCEDURE — 97110 THERAPEUTIC EXERCISES: CPT

## 2021-05-26 PROCEDURE — 97112 NEUROMUSCULAR REEDUCATION: CPT

## 2021-05-26 NOTE — PROGRESS NOTES
improvement from condition with _1_ of_1__ short term goals     Comments/Function: Complaints of fatigue and soreness following prolonged periods of WB/ambulation. No longer using her CAM boot regularly/only been approx 3-4 days without it. PT Education: [x] Home Exercise Program  Comprehension [x] Good [] Fair [] Poor  [x] Plans/Goals developed/discussed with pt/family [] Other    Recommendations: Continue with current prescription to further address her functional needs noted above. [] Patient is Discharged At This Time Unless Further Orders Are Received. New Script Needed To Continue Treatment: [x] No   [] Y es  (visits left on current prescription:       9        ) Please sign orders at the bottom of this page and return by faxing. Thank you. Current Treatment:  [x] Therapeutic Exercise    [] Modalities                [] Work Conditioning  [] Therapeutic Activity    [] Ultrasound  [] Electrical Stimulation  [] Gait Training     [] Massage       [] Lumbar/Cervical Traction  [x] Neuromuscular Re-education [x] Cold/hotpack [] Iontophoresis: 4 mg/mL  [x] Instruction in Home Exercise Program                     Dexamethasone Sodium  [x] Manual Therapy             Phosphate 40-80 mAmin  [] Aquatic Therapy                                 [] Vaso Pneumatic compression  [] Other:    Medicare/Regulatory Requirements:  I have reviewed this plan of care and certify a need for medically necessary rehabilitation services.   [] Physician Signature                                                   Date:       Thank you for your referral                 Electronically signed by: Umu Amanda, PT DPT    [facility]) @ [address], [address] [facility]  Phone [phone]  Fax [phone]    Treatment Charges: Mins Units   [] Evaluation       []  Low       []  Moderate       []  High     []  Modalities     [x]  Ther Exercise 30 2   []  Manual Therapy

## 2021-06-02 ENCOUNTER — HOSPITAL ENCOUNTER (OUTPATIENT)
Dept: PHYSICAL THERAPY | Age: 31
Setting detail: THERAPIES SERIES
Discharge: HOME | End: 2021-06-02
Payer: COMMERCIAL

## 2021-06-02 NOTE — FLOWSHEET NOTE
[x] [facility]) - [facility] & Therapy  [address]  [place]: [phone]   F: [phone]     Physical Therapy Cancel/No Show note    Date: 2021  Patient: Vanessa Jensen  : 1990  MRN: [id number]    Visit Count:   Cancels/No Shows to date:     For today's appointment patient:    [x]  Cancelled    [] Rescheduled appointment    [] No-show     Reason given by patient:    []  Patient ill    []  Conflicting appointment    [] No transportation      [] Conflict with work    [x] No reason given    [] Weather related    [] COVID-19    [] Other:      Comments:        [] Next appointment was confirmed    Electronically signed by: Durell Habermann, PTA

## 2021-06-03 ENCOUNTER — HOSPITAL ENCOUNTER (OUTPATIENT)
Dept: PHYSICAL THERAPY | Age: 31
Setting detail: THERAPIES SERIES
Discharge: HOME | End: 2021-06-03
Payer: COMMERCIAL

## 2021-06-03 PROCEDURE — 97110 THERAPEUTIC EXERCISES: CPT

## 2021-06-03 NOTE — PROGRESS NOTES
[address] [facility]  [address]         Phone: [phone]       Fax: [phone]    Physical Therapy Progress Note Update    Date:  6/3/2021  Patient: Catracho Fried  : 1990  MRN: [id number]  Physician: Hannah Corcoran DPM  Medical Diagnosis: (R) ankle/peroneal tendon repair/lateral ankle stabilization    Rehab Codes: (R) ankle pain with mobility deficits and weakness. Onset date: 2021   Next Dr's appt.: 2021  PT Visit Information  PT Insurance Information: [address]  Total # of Visits Approved: 18  Total # of Visits to Date: 10  No Show: 0  Canceled Appointment: 1     Subjective  Patient stated that she is still using her lace up brace while working. Pain still increases as the day progress and ankle still feels fatigued at the end of her shift/day. Pain  Pain:  [x] Yes   [] No      Location: distal end of lateral malleolus   Pain Rating: (0-10 scale) 2-3/10  Worst: 4/10  Best:  2/10  Symptoms:  [] Improving [x] Worsening       [] Same  Descriptors: constant, sharp, shooting, dull achy  Aggravating factors: WB ADLs   Relieving factors: rest, repositioning  Sleep: No longer disturbed.    Other:      Modalities: Hot pack to calf with passive stretching  Precautions:   Exercises:  Exercise Reps/ Time Weight/ Level Comments   Standing Toe Raises  15 reps x 2 sets        Standing Heel Raises 20 reps x 2        Standing BAPS 20 reps    Forward/Backward, Side to Side   Foot Doming 10x with 3 sec holds       Standing Ankle Supination/Pronation  20 reps       Standing (R) SLS  10 reps  10 sec hold              Manual Therapy   Contract/Relax to improve dorsiflexion 30 sec hold x 3 reps     Soft Tissue Mobilization  (R) Gastrocnemius x 5 minutes (seated position with ankle dorsiflexed in a closed chain position)     Activity Tolerance  Patient tolerated treatment well     Assessment  Continued with current exercise program/demonstrated well with min cues. Goals  STG: (to be met in 9 treatments)  1. No complaints of pain will be reported within the (R) ankle at rest/ADLs.     LTG: (to be met in 18 treatments)  1. Pt will demonstrate 5/5 MMT within all involved planes of the (R) ankle to assist with (I) function. 2. Establish a normal gait pattern without a device. 3. FAAM improved by 8-9 points (Caryn Serrano Ultramar 112)  4. Pt will demonstrate independence with her home exercise program.               Plan:   [x]? Continue per plan of care. []? Other:                          Treatment Charges: Mins Units   []? Modalities       [x]? Ther Exercise 45 3   []? Manual Therapy       []? Ther Activities       []? Aquatics       []? Neuromuscular       []? Vasocompression       []? Gait Training       []? Dry needling        []? 1 or 2 muscles        []? 3 or more muscles       []?   Other       Total Treatment time 45 3      Time In: 5698       Time Out: 4645     Electronically signed by:  Rosa Isela Coffey PT  DPT

## 2021-06-07 ENCOUNTER — HOSPITAL ENCOUNTER (OUTPATIENT)
Dept: PHYSICAL THERAPY | Age: 31
Setting detail: THERAPIES SERIES
Discharge: HOME | End: 2021-06-07
Payer: COMMERCIAL

## 2021-06-07 PROCEDURE — 97110 THERAPEUTIC EXERCISES: CPT

## 2021-06-07 NOTE — PROGRESS NOTES
509 [facility]  [address] [place] [address]. [address]         Phone: [phone]       Fax: [phone]    Physical Therapy Progress Note Update    Date:  2021  Patient: Artem Payne  : 1990  MRN: [id number]  Physician: Danni Raya DPM  Medical Diagnosis: (R) ankle/peroneal tendon repair/lateral ankle stabilization    Rehab Codes: (R) ankle pain with mobility deficits and weakness. Onset date: 2021   Next Dr's appt.: 2021  PT Visit Information  PT Insurance Information: [address]  Total # of Visits Approved: 18  Total # of Visits to Date: 11  No Show: 0  Canceled Appointment: 1     Subjective  Patient states still having increased soreness and pain in right ankle with deep flexion and too much time on feet. Stairs are still difficulty both ascending and descending due to fear, deep flexion and forced WB with stairs. Pain  Pain:  [x] Yes   [] No      Location: distal end of lateral malleolus   Pain Rating: (0-10 scale) 2-3/10  Worst: 4/10  Best:  2/10  Symptoms:  [] Improving [x] Worsening       [] Same  Descriptors: constant, sharp, shooting, dull achy  Aggravating factors: WB ADLs   Relieving factors: rest, repositioning   Sleep: No longer disturbed.    Other:      Modalities: Hot pack to calf with passive stretching  Precautions:   Exercises:  Exercise Reps/ Time Weight/ Level Comments   Standing Toe Raises  15 reps x 2 sets        Standing Heel Raises 20 reps x 2        Standing BAPS 20 reps    Forward/Backward, Side to Side   Standing Slant board stretch Gastroc (knees straight) 3x30\"     Slant board soleus stretch (knees bent) 2x30\"     Step downs F/L 10x 8\" R LE only         Foot Doming 10x with 3 sec holds       Standing Ankle Supination/Pronation  20 reps       Standing (R) SLS  10 reps  10 sec hold        Manual Therapy   Contract/Relax to improve dorsiflexion 30 sec hold x 3 reps     Soft Tissue Mobilization  (R) Gastrocnemius x 5 minutes (seated position with ankle dorsiflexed in a closed chain position) w/ stick    Activity Tolerance  Patient tolerated treatment well     Assessment  Tolerated exercises well with minimal pain noted. Added step downs, lateral step ups and soleus slant board stretch. Denies any issues with added exercises. Cueing needed with BAPS board for only ankle ROM, less upper body movement. Goals  STG: (to be met in 9 treatments)  1. No complaints of pain will be reported within the (R) ankle at rest/ADLs. LTG: (to be met in 18 treatments)  1. Pt will demonstrate 5/5 MMT within all involved planes of the (R) ankle to assist with (I) function. 2. Establish a normal gait pattern without a device. 3. FAAM improved by 8-9 points (C[address] Bar[address]ay[address] 112)  4. Pt will demonstrate independence with her home exercise program.               Plan:   [x] Continue per plan of care.              [] Other:                          Treatment Charges: Mins Units   []  Modalities       [x]  Ther Exercise 43 3   []  Manual Therapy      []  Ther Activities       []  Aquatics       []  Neuromuscular       [] Vasocompression       [] Gait Training       [] Dry needling        [] 1 or 2 muscles        [] 3 or more muscles       []  Other       Total Treatment time 43 3      Time In: 2809       Time Out: 1802      Electronically signed by:  Jose Luis Sesay PTA

## 2021-06-09 ENCOUNTER — HOSPITAL ENCOUNTER (OUTPATIENT)
Dept: PHYSICAL THERAPY | Age: 31
Setting detail: THERAPIES SERIES
Discharge: HOME | End: 2021-06-09
Payer: COMMERCIAL

## 2021-06-09 NOTE — FLOWSHEET NOTE
[x] [facility]) - [facility] & Therapy  [address]  [place]: [phone]   F: [phone]     Physical Therapy Cancel/No Show note    Date: 2021  Patient: Catracho Fried  : 1990  MRN: [id number]    Visit Count:   Cancels/No Shows to date: 3/0    For today's appointment patient:    [x]  Cancelled    [] Rescheduled appointment    [] No-show     Reason given by patient:    []  Patient ill    []  Conflicting appointment    [] No transportation      [x] Conflict with work    [] No reason given    [] Weather related    [] COVID-19    [] Other:      Comments:        [x] Next appointment was confirmed    Electronically signed by: Idny Gomez PTA

## 2021-06-14 ENCOUNTER — HOSPITAL ENCOUNTER (OUTPATIENT)
Dept: PHYSICAL THERAPY | Age: 31
Setting detail: THERAPIES SERIES
Discharge: HOME | End: 2021-06-14
Payer: COMMERCIAL

## 2021-06-14 PROCEDURE — 97110 THERAPEUTIC EXERCISES: CPT

## 2021-06-14 NOTE — PROGRESS NOTES
509 [facility]  [address] [place] [address]. [address]         Phone: [phone]       Fax: [phone]    Physical Therapy Progress Note Update    Date:  2021  Patient: Nuria Sutherland  : 1990  MRN: [id number]  Physician: Adali Jacques DPM  Medical Diagnosis: (R) ankle/peroneal tendon repair/lateral ankle stabilization    Rehab Codes: (R) ankle pain with mobility deficits and weakness. Onset date: 2021   Next Dr's appt.: 2021  PT Visit Information  PT Insurance Information: [address]  Total # of Visits Approved: 18  Total # of Visits to Date: 12  No Show: 0  Canceled Appointment: 1     Subjective  Patient states still having increased soreness and pain after working all day. Notes gaining confidence with stairs and uneven ground walking. Pain  Pain:  [x] Yes   [] No      Location: distal end of lateral malleolus   Pain Rating: (0-10 scale) 3/10  Worst: 4/10  Best:  2/10  Symptoms:  [] Improving [x] Worsening       [] Same  Descriptors: constant, sharp, shooting, dull achy  Aggravating factors: WB ADLs   Relieving factors: rest, repositioning   Sleep: No longer disturbed.    Other:      Modalities: Hot pack to calf with passive stretching (NOT TODAY)  Precautions:   Exercises:  Exercise Reps/ Time Weight/ Level Comments   Standing Toe Raises  15 reps x 2 sets        Standing Heel Raises 20 reps x 2        Standing BAPS 20 reps    Forward/Backward, Side to Side, Cw/CCW   Standing Slant board stretch Gastroc (knees straight) 3x30\"     Slant board soleus stretch (knees bent) 2x30\"     Step downs F 20x 8\" R LE only   Heel taps Lat 10x 8\" R LE only   Rocker board A/P 20x     Calf raises 2 up 1 down (eccentric)  10x  R LE only down   TG heel raises x15 reps       Standing Ankle Supination/Pronation  20 reps       Standing (R) SLS  10 reps  10 sec hold  Blue foam      Manual Therapy   Contract/Relax to improve dorsiflexion 30 sec hold x 3 reps     Soft Tissue Mobilization  (R) Gastrocnemius x 5 minutes (prone position with ankle dorsiflexed in a closed chain position) w/ stick    Activity Tolerance  Patient tolerated treatment well     Assessment   Tolerated all exercise well with no increased pain today. Performed manual to (R) gastrocnemius in prone today. Decreased tightness and better motion noted afterwards, per patient. Progressed balance to blue foam, added BAPS cw/ccw circles, eccentric calf lowering and rocker board for increasing strength, proprioception and ROM in right ankle. Goals  STG: (to be met in 9 treatments)  1. No complaints of pain will be reported within the (R) ankle at rest/ADLs. LTG: (to be met in 18 treatments)  1. Pt will demonstrate 5/5 MMT within all involved planes of the (R) ankle to assist with (I) function. 2. Establish a normal gait pattern without a device. 3. FAAM improved by 8-9 points (Caryn Herfracisco [address] 112)  4. Pt will demonstrate independence with her home exercise program.               Plan:   [x] Continue per plan of care.              [] Other:                          Treatment Charges: Mins Units   []  Modalities       [x]  Ther Exercise 40 3   []  Manual Therapy      []  Ther Activities       []  Aquatics       []  Neuromuscular       [] Vasocompression       [] Gait Training       [] Dry needling        [] 1 or 2 muscles        [] 3 or more muscles       []  Other       Total Treatment time 40 3      Time In: 0478       Time Out: 1850      Electronically signed by:  Princess Aguilar PTA

## 2021-06-15 ENCOUNTER — HOSPITAL ENCOUNTER (OUTPATIENT)
Age: 31
Setting detail: SPECIMEN
Discharge: HOME | End: 2021-06-15
Payer: COMMERCIAL

## 2021-06-15 LAB
ESTRADIOL LEVEL: 60 PG/ML (ref 27–314)
FOLLICLE STIMULATING HORMONE: 4.6 U/L (ref 1.7–21.5)
HCG QUANTITATIVE: <1 IU/L
LH: 15.7 U/L (ref 1–95.6)
PROGESTERONE LEVEL: 0.1 NG/ML

## 2021-06-16 ENCOUNTER — HOSPITAL ENCOUNTER (OUTPATIENT)
Dept: PHYSICAL THERAPY | Age: 31
Setting detail: THERAPIES SERIES
Discharge: HOME | End: 2021-06-16
Payer: COMMERCIAL

## 2021-06-16 NOTE — FLOWSHEET NOTE
[x] [facility]) - [facility] & Therapy  [address]  [place]: [phone]   F: [phone]     Physical Therapy Cancel/No Show note    Date: 2021  Patient: Susan Reis  : 1990  MRN: [id number]    Visit Count:   Cancels/No Shows to date:     For today's appointment patient:    [x]  Cancelled    [] Rescheduled appointment    [] No-show     Reason given by patient:    []  Patient ill    []  Conflicting appointment    [] No transportation      [] Conflict with work    [] No reason given    [] Weather related    [] COVID-19    [x] Other:      Comments: \"Something came up\"       [x] Next appointment was confirmed    Electronically signed by: Unique Miranda PTA

## 2021-06-20 LAB
PROTHROMBIN G20210A MUTATION: NEGATIVE
PT PCR SPECIMEN: NORMAL

## 2021-06-21 ENCOUNTER — HOSPITAL ENCOUNTER (OUTPATIENT)
Dept: PHYSICAL THERAPY | Age: 31
Setting detail: THERAPIES SERIES
Discharge: HOME | End: 2021-06-21
Payer: COMMERCIAL

## 2021-06-21 PROCEDURE — 97110 THERAPEUTIC EXERCISES: CPT

## 2021-06-21 NOTE — PROGRESS NOTES
[address] [facility]  [address] [facility] [facility]         Phone: [phone]       Fax: [phone]    Physical Therapy Daily Note    Date:  2021  Patient: Artem Payne  : 1990  MRN: [id number]  Physician: Danni Raya DPM  Medical Diagnosis: (R) ankle/peroneal tendon repair/lateral ankle stabilization    Rehab Codes: (R) ankle pain with mobility deficits and weakness. Onset date: 2021   Next Dr's appt.: 2021  PT Visit Information  PT Insurance Information: [address]  Total # of Visits Approved: 18  Total # of Visits to Date: 13  No Show: 0  Canceled Appointment: 1     Subjective   Continues to note pain after working all day. 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 Also states she feels she is improving just very slowly. Pain  Pain:  [x] Yes   [] No      Location: distal end of lateral malleolus   Pain Rating: (0-10 scale) 3/10  Worst: 4/10  Best:  2/10  Symptoms:  [] Improving [x] Worsening       [] Same  Descriptors: constant, sharp, shooting, dull achy  Aggravating factors: WB ADLs   Relieving factors: rest, repositioning   Sleep: No longer disturbed.    Other:      Modalities:   Precautions:   Exercises:  Exercise Reps/ Time Weight/ Level Comments   Standing Toe Raises  15 reps x 2 sets        Standing Heel Raises 20 reps x 2        Standing BAPS 20 reps    Forward/Backward, Side to Side, Cw/CCW   Standing Slant board stretch Gastroc (knees straight) 3x30\"     Slant board soleus stretch (knees bent) 2x30\"     Step downs F 20x 8\" R LE only   Heel taps Lat 10x 8\" R LE only   Rocker board A/P 20x     Rocker board balance 30\" hold  X 2 sets     R LE lunges to half ball 10 reps x 2 sets Green    Calf raises 2 up 1 down (eccentric)  10x  R LE only down   TG heel raises x15 reps       Standing Ankle Supination/Pronation  20 reps       Standing (R) SLS  10 reps  10 sec hold  Blue foam      Soft Tissue Mobilization  (R) Gastrocnemius x 5 minutes (prone position with ankle dorsiflexed in a closed chain position) w/ stick    Activity Tolerance  Patient tolerated treatment well     Assessment  Tolerated exercises well with minimal discomfort noted. Notes decreased tightness after manual to R calf in prone. Continued fatigue with 2 up 1 down calf exercise. Better balance and good tolerance to step downs today. Added balance on the rocker board and lunges too half green ball to challenge balance and ankle stability. Goals  STG: (to be met in 9 treatments)  1. No complaints of pain will be reported within the (R) ankle at rest/ADLs. LTG: (to be met in 18 treatments)  1. Pt will demonstrate 5/5 MMT within all involved planes of the (R) ankle to assist with (I) function. 2. Establish a normal gait pattern without a device. 3. FAAM improved by 8-9 points (Cary[address]aris Barclay[address])  4. Pt will demonstrate independence with her home exercise program.               Plan:   [x] Continue per plan of care.              [] Other:                          Treatment Charges: Mins Units   []  Modalities       [x]  Ther Exercise 40 3   []  Manual Therapy      []  Ther Activities       []  Aquatics       []  Neuromuscular       [] Vasocompression       [] Gait Training       [] Dry needling        [] 1 or 2 muscles        [] 3 or more muscles       []  Other       Total Treatment time 40 3      Time In: 8011       Time Out: 4180      Electronically signed by:  Layla Melton PTA

## 2021-06-23 ENCOUNTER — HOSPITAL ENCOUNTER (OUTPATIENT)
Dept: PHYSICAL THERAPY | Age: 31
Setting detail: THERAPIES SERIES
End: 2021-06-23
Payer: COMMERCIAL

## 2021-07-14 NOTE — PROGRESS NOTES
[x] [facility] @ [facility]  [address], [facility]  Phone [phone]  Fax [phone]    Physical Therapy Discharge Note    Date: 2021      Patient: Ever Braun  : [id number]: [id number]    Physician: Chuck Lomeli DPM    Diagnosis: (R) ankle/peroneal tendon repair/lateral ankle stabilization                 Onset Date: 2021    Rehab Diagnosis: (R) ankle pain with mobility deficits and weakness  Total visits attended: 15  Cancels/No shows:   Date of initial visit: 04/15/2021                  [] Patient recovered from conditions. Treatment goals were met. [] Patient received maximum benefit. No further therapy indicated at this time. [] Patient demonstrated improvement from condition with  ** Of  ** Short term goals met. []Patient demonstrated improvement from condition with **   Of **  Long term goals met. [] Patient to continue exercise/home instructions independently. [x] Therapy interrupted due to the patient stating that her physician has discharged her from physical therapy. [] Patient has 2 or more no shows/cancels, is discontinued per our policy. [] Patient has completed prescribed number of treatment sessions. [] Other:      Pain level at evaluation was  2     /10 and at discharge was   unknown    /10    It Is My Understanding That The:  [] Patient returned to work. [] Patient demonstrated improved level of function. [] Patient returned to previous functional level. [] Patient's current functional status is unknown due to no-shows  [] Other:     Recommendations/Comments:  Patient was contacted regarding future appointments. However, she stated that she has been discharged per her physician.     Treatment Included:     [x] Therapeutic Exercise   27784  [] Iontophoresis: 4 mg/mL Dexamethasone Sodium Phosphate  mAmin  95685   [] Therapeutic Activity  11349 [] Vasopneumatic cold with

## 2021-08-03 ENCOUNTER — HOSPITAL ENCOUNTER (OUTPATIENT)
Age: 31
Setting detail: SPECIMEN
Discharge: HOME | End: 2021-08-03
Payer: COMMERCIAL

## 2021-08-03 ENCOUNTER — NURSE ONLY (OUTPATIENT)
Dept: FAMILY MEDICINE CLINIC | Age: 31
End: 2021-08-03

## 2021-08-03 DIAGNOSIS — Z11.52 ENCOUNTER FOR SCREENING FOR COVID-19: Primary | ICD-10-CM

## 2021-08-04 DIAGNOSIS — Z11.52 ENCOUNTER FOR SCREENING FOR COVID-19: ICD-10-CM

## 2021-08-04 LAB
SARS-COV-2: NORMAL
SARS-COV-2: NOT DETECTED
SOURCE: NORMAL

## 2021-09-08 ENCOUNTER — NURSE TRIAGE (OUTPATIENT)
Dept: OTHER | Facility: CLINIC | Age: 31
End: 2021-09-08

## 2021-09-08 NOTE — TELEPHONE ENCOUNTER
Reason for Disposition   Large node present > 2 weeks    Answer Assessment - Initial Assessment Questions  1. LOCATION: \"Where is the swollen node located? \" \"Is the matching node on the other side of the body also swollen? \"       On the right side of neck- under jaw    2. SIZE: \"How big is the node? \" (Inches or centimeters) (or compare to common objects such as pea, bean, marble, golf ball)       Feels like a \"lump\"- size of a ping pong ball    3. ONSET: \"When did the swelling start? \"       4-5 weeks ago    4. NECK NODES: \"Is there a sore throat, runny nose or other symptoms of a cold? \"       Denies- reports some \"allergies\"- \"nothing major\" per patient    5. GROIN OR ARMPIT NODES: \"Is there a sore, scratch, cut or painful red area on that arm or leg? \"      Denies    6. FEVER: \"Do you have a fever? \" If so, ask: \"What is it, how was it measured, and when did it start? \"       Denies    7. CAUSE: \"What do you think is causing the swollen lymph nodes? \"      Unsure    8. OTHER SYMPTOMS: \"Do you have any other symptoms? \"      Headaches- every now and then, fatigue (feels like always tired)    9. PREGNANCY: \"Is there any chance you are pregnant? \" \"When was your last menstrual period? \"      8/16/2021    Protocols used: LYMPH NODES - SWOLLEN-ADULT-OH    Received call from Kenzie at [facility] with Red Flag Complaint. Brief description of triage: See above. Triage indicates for patient to be seen in the next two weeks. Care advice provided, patient verbalizes understanding; denies any other questions or concerns; instructed to call back for any new or worsening symptoms. Writer provided warm transfer to Isaias at [facility] for appointment scheduling. Attention Provider: Thank you for allowing me to participate in the care of your patient. The patient was connected to triage in response to information provided to the [facility].   Please do not respond through this encounter as the response is not directed to a shared pool.

## 2021-09-21 ENCOUNTER — OFFICE VISIT (OUTPATIENT)
Dept: INTERNAL MEDICINE CLINIC | Age: 31
End: 2021-09-21
Payer: COMMERCIAL

## 2021-09-21 VITALS
OXYGEN SATURATION: 99 % | SYSTOLIC BLOOD PRESSURE: 132 MMHG | BODY MASS INDEX: 39.68 KG/M2 | HEART RATE: 95 BPM | DIASTOLIC BLOOD PRESSURE: 80 MMHG | WEIGHT: 224 LBS

## 2021-09-21 DIAGNOSIS — R22.1 LOCALIZED SWELLING, MASS AND LUMP, NECK: Primary | ICD-10-CM

## 2021-09-21 DIAGNOSIS — Z13.220 SCREENING FOR HYPERLIPIDEMIA: ICD-10-CM

## 2021-09-21 DIAGNOSIS — Z13.1 SCREENING FOR DIABETES MELLITUS: ICD-10-CM

## 2021-09-21 PROCEDURE — 99203 OFFICE O/P NEW LOW 30 MIN: CPT | Performed by: NURSE PRACTITIONER

## 2021-09-21 RX ORDER — DESOGESTREL AND ETHINYL ESTRADIOL 0.15-0.03
KIT ORAL
COMMUNITY
Start: 2021-09-07

## 2021-09-21 SDOH — ECONOMIC STABILITY: FOOD INSECURITY: WITHIN THE PAST 12 MONTHS, THE FOOD YOU BOUGHT JUST DIDN'T LAST AND YOU DIDN'T HAVE MONEY TO GET MORE.: NEVER TRUE

## 2021-09-21 SDOH — ECONOMIC STABILITY: FOOD INSECURITY: WITHIN THE PAST 12 MONTHS, YOU WORRIED THAT YOUR FOOD WOULD RUN OUT BEFORE YOU GOT MONEY TO BUY MORE.: NEVER TRUE

## 2021-09-21 ASSESSMENT — PATIENT HEALTH QUESTIONNAIRE - PHQ9
SUM OF ALL RESPONSES TO PHQ QUESTIONS 1-9: 0
SUM OF ALL RESPONSES TO PHQ9 QUESTIONS 1 & 2: 0
1. LITTLE INTEREST OR PLEASURE IN DOING THINGS: 0
SUM OF ALL RESPONSES TO PHQ QUESTIONS 1-9: 0
SUM OF ALL RESPONSES TO PHQ QUESTIONS 1-9: 0
2. FEELING DOWN, DEPRESSED OR HOPELESS: 0

## 2021-09-21 ASSESSMENT — SOCIAL DETERMINANTS OF HEALTH (SDOH): HOW HARD IS IT FOR YOU TO PAY FOR THE VERY BASICS LIKE FOOD, HOUSING, MEDICAL CARE, AND HEATING?: NOT HARD AT ALL

## 2021-09-21 NOTE — PROGRESS NOTES
Aloysius Kayser is a 31 y.o. female here to establish care. Patient previously seeing [facility] . Patient reports past medical history as below. Patient negative history of tobacco use. Patient is , no children. 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 Mass on Right side of neck  noted  6 weeks ago  Mass is nontender and soft on palpation  Denies URI symptoms, mouth/dental pain or excessive salivation  Denies difficulty chewing or swallowing food  Denies fatigue, hot/cold intolerances, heart palpitations  No personal hx of goiter or thyroid problems  Strong family hx of hypothyroidism    PAST MEDICAL HISTORY:          Diagnosis Date    Back pain     Dizzy spells     History of hysteroscopy     Right ankle injury     Wellness examination 03/09/2021    [facility] physicians-[address]-last visit jan 2020       PAST SURGICAL HISTORY:          Procedure Laterality Date    ANKLE FRACTURE SURGERY Left 3/17/2021    ANKLE ARTHROSCOPY, , MODIFIED BROSTROM PROCEDURE, PERONEAL TENDON REPAIR  (ROBYN) performed by Kely Jose DPM at [address] [place] [address] Road Left 03/17/2021    ANKLE ARTHROSCOPY, , MODIFIED BROSTROM PROCEDURE, PERONEAL TENDON REPAIR (ROBYN) - Left       ALLERGIES:    No Known Allergies      MEDICATION:      Current Outpatient Medications on File Prior to Visit   Medication Sig Dispense Refill    APRI 0.15-30 MG-MCG per tablet       Coenzyme Q10 (COQ-10 PO) Take by mouth      Prenatal MV-Min-Fe Fum-FA-DHA (PRENATAL 1 PO) Take by mouth       No current facility-administered medications on file prior to visit.        FAMILY HISTORY:          Problem Relation Age of Onset    Hypothyroidism Mother     Hypothyroidism Sister     Hypothyroidism Maternal Aunt     No Known Problems Father        SOCIAL HISTORY:      Social History     Socioeconomic History    Marital status: Single     Spouse name: None    Number of children: None    Years of education: None    Highest education level: None   Occupational History    None   Tobacco Use    Smoking status: Never Smoker    Smokeless tobacco: Never Used   Vaping Use    Vaping Use: Never used   Substance and Sexual Activity    Alcohol use: Yes     Comment: weekly    Drug use: Never    Sexual activity: Yes     Partners: Male   Other Topics Concern    None   Social History Narrative    None     Social Determinants of Health     Financial Resource Strain: Low Risk     Difficulty of Paying Living Expenses: Not hard at all   Food Insecurity: No Food Insecurity    Worried About Running Out of Food in the Last Year: Never true    Marii of Food in the Last Year: Never true   Transportation Needs:     Lack of Transportation (Medical):  Lack of Transportation (Non-Medical):    Physical Activity:     Days of Exercise per Week:     Minutes of Exercise per Session:    Stress:     Feeling of Stress :    Social Connections:     Frequency of Communication with Friends and Family:     Frequency of Social Gatherings with Friends and Family:     Attends Yazdanism Services:     Active Member of Clubs or Organizations:     Attends Club or Organization Meetings:     Marital Status:    Intimate Partner Violence:     Fear of Current or Ex-Partner:     Emotionally Abused:     Physically Abused:     Sexually Abused:          REVIEW OF SYSTEMS:   Review of Systems   Constitutional: Negative for chills, diaphoresis, fatigue, fever and unexpected weight change. HENT: Negative for congestion, postnasal drip, rhinorrhea, sneezing, sore throat and trouble swallowing. Eyes: Negative for visual disturbance. Respiratory: Negative for cough, chest tightness, shortness of breath and wheezing. Cardiovascular: Negative for chest pain. Gastrointestinal: Negative for constipation, diarrhea, nausea and vomiting. Endocrine: Negative for polydipsia, polyphagia and polyuria.    Genitourinary: Negative for difficulty urinating, No results found for: CHOL, TRIG, HDL  Thyroid functions:   Lab Results   Component Value Date    TSH 1.55 03/04/2021      Hepatic functions: No results found for: ALT, AST, PROT, BILITOT, BILIDIR, LABALBU    ASSESSMENT AND PLAN:     1. Localized swelling, mass and lump, neck  - TSH With Reflex Ft4; Future  - US HEAD NECK SOFT TISSUE THYROID; Future  - CBC With Auto Differential; Future (r/o infectious cause)    2. Screening for hyperlipidemia  - Lipid, Fasting; Future    3. Screening for diabetes mellitus  - Comprehensive Metabolic Panel; Future      INSTRUCTIONS:   Return if symptoms worsen or fail to improve. Hong Prince received counseling onthe following healthy behaviors: nutrition, exercise and medication adherence    Reviewed prior labs and healthmaintenance. Discussed use, benefit, and side effects of prescribed medications. Barriers tomedication compliance addressed. All patient questions answered. Patient voiced understanding. Patient given educational materials - see patient instructions    MACIE Alexander - CNP   HODA RODRIGUES [facility]  9/22/2021, 11:53 AM    Please note that this chart was generated using voice recognition Dragon dictation software. Although every effort was made to ensure the accuracy of this automatedtranscription, some errors in transcription may have occurred.

## 2021-09-22 ENCOUNTER — HOSPITAL ENCOUNTER (OUTPATIENT)
Age: 31
Setting detail: SPECIMEN
Discharge: HOME | End: 2021-09-22
Payer: COMMERCIAL

## 2021-09-22 DIAGNOSIS — Z13.1 SCREENING FOR DIABETES MELLITUS: ICD-10-CM

## 2021-09-22 DIAGNOSIS — Z13.220 SCREENING FOR HYPERLIPIDEMIA: ICD-10-CM

## 2021-09-22 DIAGNOSIS — R22.1 LOCALIZED SWELLING, MASS AND LUMP, NECK: ICD-10-CM

## 2021-09-22 LAB
ABSOLUTE EOS #: 0.07 K/UL (ref 0–0.44)
ABSOLUTE IMMATURE GRANULOCYTE: <0.03 K/UL (ref 0–0.3)
ABSOLUTE LYMPH #: 2.22 K/UL (ref 1.1–3.7)
ABSOLUTE MONO #: 0.59 K/UL (ref 0.1–1.2)
ALBUMIN SERPL-MCNC: 3.6 G/DL (ref 3.5–5.2)
ALBUMIN/GLOBULIN RATIO: 1.1 (ref 1–2.5)
ALP BLD-CCNC: 82 U/L (ref 35–104)
ALT SERPL-CCNC: 11 U/L (ref 5–33)
ANION GAP SERPL CALCULATED.3IONS-SCNC: 12 MMOL/L (ref 9–17)
AST SERPL-CCNC: 16 U/L
BASOPHILS # BLD: 1 % (ref 0–2)
BASOPHILS ABSOLUTE: 0.07 K/UL (ref 0–0.2)
BILIRUB SERPL-MCNC: 0.3 MG/DL (ref 0.3–1.2)
BUN BLDV-MCNC: 7 MG/DL (ref 6–20)
BUN/CREAT BLD: NORMAL (ref 9–20)
CALCIUM SERPL-MCNC: 8.9 MG/DL (ref 8.6–10.4)
CHLORIDE BLD-SCNC: 104 MMOL/L (ref 98–107)
CHOLESTEROL, FASTING: 145 MG/DL
CHOLESTEROL/HDL RATIO: 2.5
CO2: 20 MMOL/L (ref 20–31)
CREAT SERPL-MCNC: 0.65 MG/DL (ref 0.5–0.9)
DIFFERENTIAL TYPE: NORMAL
EOSINOPHILS RELATIVE PERCENT: 1 % (ref 1–4)
GFR AFRICAN AMERICAN: >60 ML/MIN
GFR NON-AFRICAN AMERICAN: >60 ML/MIN
GFR SERPL CREATININE-BSD FRML MDRD: NORMAL ML/MIN/{1.73_M2}
GFR SERPL CREATININE-BSD FRML MDRD: NORMAL ML/MIN/{1.73_M2}
GLUCOSE BLD-MCNC: 93 MG/DL (ref 70–99)
HCT VFR BLD CALC: 41.8 % (ref 36.3–47.1)
HDLC SERPL-MCNC: 57 MG/DL
HEMOGLOBIN: 13.2 G/DL (ref 11.9–15.1)
IMMATURE GRANULOCYTES: 0 %
LDL CHOLESTEROL: 70 MG/DL (ref 0–130)
LYMPHOCYTES # BLD: 32 % (ref 24–43)
MCH RBC QN AUTO: 28.2 PG (ref 25.2–33.5)
MCHC RBC AUTO-ENTMCNC: 31.6 G/DL (ref 28.4–34.8)
MCV RBC AUTO: 89.3 FL (ref 82.6–102.9)
MONOCYTES # BLD: 9 % (ref 3–12)
NRBC AUTOMATED: 0 PER 100 WBC
PDW BLD-RTO: 12.6 % (ref 11.8–14.4)
PLATELET # BLD: 350 K/UL (ref 138–453)
PLATELET ESTIMATE: NORMAL
PMV BLD AUTO: 10.3 FL (ref 8.1–13.5)
POTASSIUM SERPL-SCNC: 4.7 MMOL/L (ref 3.7–5.3)
RBC # BLD: 4.68 M/UL (ref 3.95–5.11)
RBC # BLD: NORMAL 10*6/UL
SEG NEUTROPHILS: 57 % (ref 36–65)
SEGMENTED NEUTROPHILS ABSOLUTE COUNT: 3.93 K/UL (ref 1.5–8.1)
SODIUM BLD-SCNC: 136 MMOL/L (ref 135–144)
TOTAL PROTEIN: 7 G/DL (ref 6.4–8.3)
TRIGLYCERIDE, FASTING: 92 MG/DL
TSH SERPL DL<=0.05 MIU/L-ACNC: 2.06 MIU/L (ref 0.3–5)
VLDLC SERPL CALC-MCNC: NORMAL MG/DL (ref 1–30)
WBC # BLD: 6.9 K/UL (ref 3.5–11.3)
WBC # BLD: NORMAL 10*3/UL

## 2021-09-22 ASSESSMENT — ENCOUNTER SYMPTOMS
CHEST TIGHTNESS: 0
NAUSEA: 0
SHORTNESS OF BREATH: 0
CONSTIPATION: 0
TROUBLE SWALLOWING: 0
RHINORRHEA: 0
COLOR CHANGE: 0
VOMITING: 0
WHEEZING: 0
DIARRHEA: 0
SORE THROAT: 0
COUGH: 0

## 2021-09-27 ENCOUNTER — HOSPITAL ENCOUNTER (OUTPATIENT)
Dept: ULTRASOUND IMAGING | Age: 31
Discharge: HOME | End: 2021-09-29
Payer: COMMERCIAL

## 2021-09-27 DIAGNOSIS — R22.1 LOCALIZED SWELLING, MASS AND LUMP, NECK: ICD-10-CM

## 2021-09-27 PROCEDURE — 76536 US EXAM OF HEAD AND NECK: CPT

## 2021-09-28 DIAGNOSIS — R22.1 NECK MASS: Primary | ICD-10-CM

## 2021-10-11 ENCOUNTER — HOSPITAL ENCOUNTER (OUTPATIENT)
Dept: CT IMAGING | Age: 31
Discharge: HOME | End: 2021-10-13
Payer: COMMERCIAL

## 2021-10-11 DIAGNOSIS — R22.1 NECK MASS: ICD-10-CM

## 2021-10-11 PROCEDURE — 70491 CT SOFT TISSUE NECK W/DYE: CPT

## 2021-10-11 PROCEDURE — 2580000003 HC RX 258: Performed by: NURSE PRACTITIONER

## 2021-10-11 PROCEDURE — 6360000004 HC RX CONTRAST MEDICATION: Performed by: NURSE PRACTITIONER

## 2021-10-11 RX ORDER — 0.9 % SODIUM CHLORIDE 0.9 %
80 INTRAVENOUS SOLUTION INTRAVENOUS ONCE
Status: COMPLETED | OUTPATIENT
Start: 2021-10-11 | End: 2021-10-11

## 2021-10-11 RX ORDER — SODIUM CHLORIDE 0.9 % (FLUSH) 0.9 %
10 SYRINGE (ML) INJECTION PRN
Status: DISCONTINUED | OUTPATIENT
Start: 2021-10-11 | End: 2021-10-14 | Stop reason: HOSPADM

## 2021-10-11 RX ADMIN — SODIUM CHLORIDE, PRESERVATIVE FREE 10 ML: 5 INJECTION INTRAVENOUS at 14:41

## 2021-10-11 RX ADMIN — IOPAMIDOL 75 ML: 755 INJECTION, SOLUTION INTRAVENOUS at 14:40

## 2021-10-11 RX ADMIN — SODIUM CHLORIDE 80 ML: 9 INJECTION, SOLUTION INTRAVENOUS at 14:41

## 2021-10-18 ENCOUNTER — TELEPHONE (OUTPATIENT)
Dept: INTERNAL MEDICINE CLINIC | Age: 31
End: 2021-10-18

## 2021-11-17 ENCOUNTER — HOSPITAL ENCOUNTER (OUTPATIENT)
Age: 31
Setting detail: SPECIMEN
Discharge: HOME | End: 2021-11-17

## 2021-11-17 ENCOUNTER — TELEMEDICINE (OUTPATIENT)
Dept: INTERNAL MEDICINE CLINIC | Age: 31
End: 2021-11-17
Payer: COMMERCIAL

## 2021-11-17 ENCOUNTER — TELEPHONE (OUTPATIENT)
Dept: INTERNAL MEDICINE CLINIC | Age: 31
End: 2021-11-17

## 2021-11-17 DIAGNOSIS — J02.9 ACUTE PHARYNGITIS, UNSPECIFIED ETIOLOGY: ICD-10-CM

## 2021-11-17 DIAGNOSIS — J06.9 VIRAL URI WITH COUGH: Primary | ICD-10-CM

## 2021-11-17 PROCEDURE — 99213 OFFICE O/P EST LOW 20 MIN: CPT | Performed by: NURSE PRACTITIONER

## 2021-11-17 NOTE — TELEPHONE ENCOUNTER
Pt called & stated that she saw Aline Hill today as a VV & stated that Aline Hill informed pt once she went and got her Covid swab that [employer] would approve a work note for covid symptoms. Pt states Aline Hill okay work excuse for today, 11/17/21 and tomorrow 11/18/21. If approved please fax work note to [facility]; Caryn Gonzalez @ [phone]    Please advise.

## 2021-11-17 NOTE — PROGRESS NOTES
Visit Information    Have you changed or started any medications since your last visit including any over-the-counter medicines, vitamins, or herbal medicines? no   Are you having any side effects from any of your medications? -  no  Have you stopped taking any of your medications? Is so, why? -  no    Have you seen any other physician or provider since your last visit? No  Have you had any other diagnostic tests since your last visit? No  Have you been seen in the emergency room and/or had an admission to a hospital since we last saw you? No  Have you had your routine dental cleaning in the past 6 months? yes -     Have you activated your [employer] account? If not, what are your barriers?  Yes     Patient Care Team:  MACIE Cruz CNP as PCP - General (Nurse Practitioner)  MACIE Cruz CNP as PCP - [facility]    Medical History Review  Past Medical, Family, and Social History reviewed and does contribute to the patient presenting condition    Health Maintenance   Topic Date Due    Varicella vaccine (1 of 2 - 2-dose childhood series) Never done    COVID-19 Vaccine (1) Never done    Cervical cancer screen  Never done    Flu vaccine (1) Never done    DTaP/Tdap/Td vaccine (2 - Td or Tdap) 09/27/2023    Hepatitis C screen  Completed    HIV screen  Completed    Hepatitis A vaccine  Aged Out    Hepatitis B vaccine  Aged Out    Hib vaccine  Aged Out    Meningococcal (ACWY) vaccine  Aged Out    Pneumococcal 0-64 years Vaccine  Aged Out         [address] [address]  Dept: [phone]  Dept Fax: [phone]    Virtual Visit Progress Note  Date of patient's visit: 11/18/2021  Patient's Name:  Jayme Jenkins YOB: 1990            Patient Care Team:  MACIE Cruz CNP as PCP - General (Nurse Practitioner)  MACIE Cruz CNP as PCP - [facility]    REASON FOR VISIT:  Same day visit    HISTORY OF PRESENT ILLNESS:      Chief Complaint   Patient presents with    Cough    Chills    Pharyngitis       History was obtained from the patient. Malvin Pretty is a 27 y.o. female here today virtually for cough, sore throat, chills, low grade fever 99F. Symptoms started this morning. Reports  has had similar symptoms over the past couple of weeks and tested for COVID multiple times.  COVID negative. Patient has not been tested personally for COVID 19. Patient Active Problem List   Diagnosis    PCOS (polycystic ovarian syndrome)       MEDICATIONS:      Current Outpatient Medications   Medication Sig Dispense Refill    APRI 0.15-30 MG-MCG per tablet       Coenzyme Q10 (COQ-10 PO) Take by mouth      Prenatal MV-Min-Fe Fum-FA-DHA (PRENATAL 1 PO) Take by mouth       No current facility-administered medications for this visit. ALLERGIES:    No Known Allergies    SOCIAL HISTORY   Reviewed and no change from previous record. Teresa Horton  reports that she has never smoked. She has never used smokeless tobacco.    FAMILY HISTORY:    Reviewed and No change from previous visit    REVIEW OF SYSTEMS:    Review of Systems   Constitutional: Positive for chills and fever. Negative for diaphoresis, fatigue and unexpected weight change. HENT: Positive for sore throat. Negative for congestion, postnasal drip, rhinorrhea, sneezing and trouble swallowing. Eyes: Negative for visual disturbance. Respiratory: Positive for cough. Negative for chest tightness, shortness of breath and wheezing. Cardiovascular: Negative for chest pain. Gastrointestinal: Negative for abdominal pain, constipation, diarrhea, nausea and vomiting. Musculoskeletal: Negative for arthralgias and myalgias. Skin: Negative for color change and rash. PHYSICAL EXAM:    There were no vitals filed for this visit. Exam was limited due to virtual encounter.     General - alert, well appearing, non toxic, in no distress  Skin - normal coloration and turgor, no rash  Eyes - sclera appear clear, no conjunctival injection, no discharge  Ears - no pain with manipulation of tragus or auricle, no drainage, no mastoid tenderness, hearing normal   Nose - normal and patent, no erythema, discharge  Mouth - mucous membranes are moist  Neck - supple, no masses appreciated  Chest - respirations are unlabored, no tachypnea or signs or respiratory distress  Abdomen - soft, nontender, nondistended  Neurological - alert, oriented x 3, normal speech  Extremities - no pedal edema    ASSESSMENT AND PLAN:      1. Viral URI with cough  - encourage supportive care measures (rest, fluids, Tylenol/Motrin PRN for pain or fever)  - patient encouraged to remain in quarantine pending test results. - Strep A DNA probe, amplification; Future  - COVID-19; Future    2. Acute pharyngitis, unspecified etiology  - Strep A DNA probe, amplification; Future  - COVID-19; Future      FOLLOW UP AND INSTRUCTIONS:   No follow-ups on file. Discussed use, benefit, and side effects of prescribed medications. All patient questions answered. Patient voiced understanding. Patient given educational materials - see patient instructions    Patient being evaluated by a Virtual Visit (video visit) encounter to address concerns as mentioned above. A caregiver was present when appropriate. Due to this being a TeleHealth encounter (During [place] [address] public health emergency), evaluation of the following organ systems was limited: Vitals/Constitutional/EENT/Resp/CV/GI//MS/Neuro/Skin/Heme-Lymph-Imm.   Pursuant to the emergency declaration under the [facility], [address] [place] [address] authority and the [employer] and [employer] Act, this Virtual Visit was conducted with patient's (and/or legal guardian's) consent, to reduce the patient's risk of exposure to COVID-19 and provide necessary medical care.  The patient (and/or legal guardian) has also been advised to contact this office for worsening conditions or problems, and seek emergency medical treatment and/or call 911 if deemed necessary. Services were provided through a video synchronous discussion virtually to substitute for in-person clinic visit. Patient and provider were located at their individual homes. MACIE Alvares - HARISH   [facility]  11/18/2021, 8:05 AM    Please note that this chart wasgenerated using voice recognition Dragon dictation software. Although every effort was made to ensure the accuracy of this automated transcription, some errors in transcription may have occurred.

## 2021-11-18 LAB
DIRECT EXAM: NORMAL
Lab: NORMAL
SARS-COV-2: NORMAL
SARS-COV-2: NOT DETECTED
SOURCE: NORMAL
SPECIMEN DESCRIPTION: NORMAL

## 2021-11-18 ASSESSMENT — ENCOUNTER SYMPTOMS
TROUBLE SWALLOWING: 0
SHORTNESS OF BREATH: 0
CONSTIPATION: 0
NAUSEA: 0
DIARRHEA: 0
WHEEZING: 0
ABDOMINAL PAIN: 0
CHEST TIGHTNESS: 0
COUGH: 1
VOMITING: 0
SORE THROAT: 1
COLOR CHANGE: 0
RHINORRHEA: 0

## 2022-06-10 ENCOUNTER — HOSPITAL ENCOUNTER (OUTPATIENT)
Age: 32
Setting detail: SPECIMEN
Discharge: HOME | End: 2022-06-10

## 2022-06-10 LAB
ABO/RH: NORMAL
ANTIBODY SCREEN: NEGATIVE
HISTORY CHECK: NORMAL

## (undated) DEVICE — APPLICATOR MEDICATED 26 CC SOLUTION HI LT ORNG CHLORAPREP

## (undated) DEVICE — 1016 S-DRAPE IRRIG POUCH 10/BOX: Brand: STERI-DRAPE™

## (undated) DEVICE — GOWN,AURORA,NONREINFORCED,LARGE: Brand: MEDLINE

## (undated) DEVICE — GLOVE ORANGE PI 7 1/2   MSG9075

## (undated) DEVICE — GLOVE EXAM M L95IN FNGR THK35MIL PALM THK24MIL OFF WHT

## (undated) DEVICE — C-ARM: Brand: UNBRANDED

## (undated) DEVICE — INTENDED FOR TISSUE SEPARATION, AND OTHER PROCEDURES THAT REQUIRE A SHARP SURGICAL BLADE TO PUNCTURE OR CUT.: Brand: BARD-PARKER ® CARBON RIB-BACK BLADES

## (undated) DEVICE — DRILL: Brand: SONICFUSION

## (undated) DEVICE — STERLING GATOR SHAVER BLADE, 2.9 MM: Brand: STERLING GATOR

## (undated) DEVICE — STERLING XTRASHARP SHAVER GREAT WHITE SHAVER BLADE, 3.5 MM: Brand: STERLING XTRASHARP SHAVER GREAT WHITE

## (undated) DEVICE — PADDING 4YDX4IN COTTON NONSTER WEBRIL

## (undated) DEVICE — BNDG,ELSTC,MATRIX,STRL,6"X5YD,LF,HOOK&LP: Brand: MEDLINE

## (undated) DEVICE — SUTURE MCRYL SZ 4-0 L18IN ABSRB UD L16MM PC-3 3/8 CIR PRIM Y845G

## (undated) DEVICE — GLOVE ORANGE PI 8 1/2   MSG9085

## (undated) DEVICE — SUTURE MCRYL SZ 2-0 L27IN ABSRB UD SH L26MM TAPERPOINT NDL Y417H

## (undated) DEVICE — SVMMC POD PK

## (undated) DEVICE — GLOVE ORTHO 8   MSG9480

## (undated) DEVICE — BANDAGE,GAUZE,BULKEE II,4.5"X4.1YD,STRL: Brand: MEDLINE

## (undated) DEVICE — SPLINT ORTH W5XL30IN WHT FBRGLS 1 SIDE FELT PD CNFRM LO

## (undated) DEVICE — ZIMMER® STERILE DISPOSABLE TOURNIQUET CUFF WITH PROTECTIVE SLEEVE AND PLC, DUAL PORT, SINGLE BLADDER, 34 IN. (86 CM)

## (undated) DEVICE — 10K ARTHROSCOPY INFLOW/OUTFLOW TUBE SET: Brand: 10K

## (undated) DEVICE — NEEDLE SPNL 18GA L3.5IN W/ QNCKE SHARPER BVL DURA CLICK

## (undated) DEVICE — Z DISCONTINUED NO SUB IDED JR BANDAGE COMPR ELASTIC 6 IN ACE

## (undated) DEVICE — BANDAGE COMPR ELASTIC 4 IN STRL ACE